# Patient Record
Sex: FEMALE | Race: WHITE | NOT HISPANIC OR LATINO | Employment: FULL TIME | URBAN - METROPOLITAN AREA
[De-identification: names, ages, dates, MRNs, and addresses within clinical notes are randomized per-mention and may not be internally consistent; named-entity substitution may affect disease eponyms.]

---

## 2019-07-24 ENCOUNTER — OFFICE VISIT CONVERTED (OUTPATIENT)
Dept: FAMILY MEDICINE CLINIC | Facility: CLINIC | Age: 34
End: 2019-07-24
Attending: NURSE PRACTITIONER

## 2019-08-02 ENCOUNTER — HOSPITAL ENCOUNTER (OUTPATIENT)
Dept: SLEEP MEDICINE | Facility: HOSPITAL | Age: 34
Discharge: HOME | End: 2019-08-02
Attending: INTERNAL MEDICINE

## 2019-08-20 ENCOUNTER — HOSPITAL ENCOUNTER (OUTPATIENT)
Dept: SLEEP MEDICINE | Facility: HOSPITAL | Age: 34
Discharge: HOME | End: 2019-08-20
Attending: INTERNAL MEDICINE

## 2019-09-13 ENCOUNTER — HOSPITAL ENCOUNTER (OUTPATIENT)
Dept: SLEEP MEDICINE | Facility: HOSPITAL | Age: 34
Discharge: HOME | End: 2019-09-13
Attending: INTERNAL MEDICINE

## 2019-11-12 ENCOUNTER — OFFICE VISIT CONVERTED (OUTPATIENT)
Dept: FAMILY MEDICINE CLINIC | Facility: CLINIC | Age: 34
End: 2019-11-12
Attending: NURSE PRACTITIONER

## 2020-01-20 ENCOUNTER — CONVERSION ENCOUNTER (OUTPATIENT)
Dept: FAMILY MEDICINE CLINIC | Facility: CLINIC | Age: 35
End: 2020-01-20

## 2020-02-04 ENCOUNTER — OFFICE VISIT CONVERTED (OUTPATIENT)
Dept: FAMILY MEDICINE CLINIC | Facility: CLINIC | Age: 35
End: 2020-02-04
Attending: FAMILY MEDICINE

## 2020-02-04 ENCOUNTER — HOSPITAL ENCOUNTER (OUTPATIENT)
Dept: FAMILY MEDICINE CLINIC | Facility: CLINIC | Age: 35
Discharge: HOME | End: 2020-02-04
Attending: FAMILY MEDICINE

## 2020-02-25 ENCOUNTER — CONVERSION ENCOUNTER (OUTPATIENT)
Dept: NEUROLOGY | Facility: CLINIC | Age: 35
End: 2020-02-25

## 2020-02-25 ENCOUNTER — OFFICE VISIT CONVERTED (OUTPATIENT)
Dept: NEUROLOGY | Facility: CLINIC | Age: 35
End: 2020-02-25
Attending: PSYCHIATRY & NEUROLOGY

## 2020-03-09 ENCOUNTER — HOSPITAL ENCOUNTER (OUTPATIENT)
Dept: FAMILY MEDICINE CLINIC | Facility: CLINIC | Age: 35
Discharge: HOME | End: 2020-03-09
Attending: FAMILY MEDICINE

## 2020-03-09 ENCOUNTER — OFFICE VISIT CONVERTED (OUTPATIENT)
Dept: FAMILY MEDICINE CLINIC | Facility: CLINIC | Age: 35
End: 2020-03-09
Attending: FAMILY MEDICINE

## 2020-03-11 LAB — BACTERIA SPEC AEROBE CULT: ABNORMAL

## 2020-09-24 ENCOUNTER — HOSPITAL ENCOUNTER (OUTPATIENT)
Dept: FAMILY MEDICINE CLINIC | Facility: CLINIC | Age: 35
Discharge: HOME | End: 2020-09-24
Attending: NURSE PRACTITIONER

## 2020-09-24 ENCOUNTER — OFFICE VISIT CONVERTED (OUTPATIENT)
Dept: FAMILY MEDICINE CLINIC | Facility: CLINIC | Age: 35
End: 2020-09-24
Attending: NURSE PRACTITIONER

## 2020-09-24 LAB
BASOPHILS # BLD AUTO: 0.04 10*3/UL (ref 0–0.2)
BASOPHILS NFR BLD AUTO: 0.5 % (ref 0–3)
CONV ABS IMM GRAN: 0.02 10*3/UL (ref 0–0.2)
CONV IMMATURE GRAN: 0.2 % (ref 0–1.8)
DEPRECATED RDW RBC AUTO: 45.8 FL (ref 36.4–46.3)
EOSINOPHIL # BLD AUTO: 0.08 10*3/UL (ref 0–0.7)
EOSINOPHIL # BLD AUTO: 1 % (ref 0–7)
ERYTHROCYTE [DISTWIDTH] IN BLOOD BY AUTOMATED COUNT: 12.8 % (ref 11.7–14.4)
HCT VFR BLD AUTO: 44.3 % (ref 37–47)
HGB BLD-MCNC: 13.7 G/DL (ref 12–16)
LYMPHOCYTES # BLD AUTO: 2.58 10*3/UL (ref 1–5)
LYMPHOCYTES NFR BLD AUTO: 31.6 % (ref 20–45)
MCH RBC QN AUTO: 29.9 PG (ref 27–31)
MCHC RBC AUTO-ENTMCNC: 30.9 G/DL (ref 33–37)
MCV RBC AUTO: 96.7 FL (ref 81–99)
MONOCYTES # BLD AUTO: 0.55 10*3/UL (ref 0.2–1.2)
MONOCYTES NFR BLD AUTO: 6.7 % (ref 3–10)
NEUTROPHILS # BLD AUTO: 4.89 10*3/UL (ref 2–8)
NEUTROPHILS NFR BLD AUTO: 60 % (ref 30–85)
NRBC CBCN: 0 % (ref 0–0.7)
PLATELET # BLD AUTO: 345 10*3/UL (ref 130–400)
PMV BLD AUTO: 11.1 FL (ref 9.4–12.3)
RBC # BLD AUTO: 4.58 10*6/UL (ref 4.2–5.4)
WBC # BLD AUTO: 8.16 10*3/UL (ref 4.8–10.8)

## 2020-09-25 LAB
ALBUMIN SERPL-MCNC: 4.3 G/DL (ref 3.5–5)
ALBUMIN/GLOB SERPL: 1.5 {RATIO} (ref 1.4–2.6)
ALP SERPL-CCNC: 71 U/L (ref 42–98)
ALT SERPL-CCNC: 10 U/L (ref 10–40)
ANION GAP SERPL CALC-SCNC: 17 MMOL/L (ref 8–19)
AST SERPL-CCNC: 18 U/L (ref 15–50)
BILIRUB SERPL-MCNC: 0.28 MG/DL (ref 0.2–1.3)
BUN SERPL-MCNC: 8 MG/DL (ref 5–25)
BUN/CREAT SERPL: 14 {RATIO} (ref 6–20)
CALCIUM SERPL-MCNC: 9.2 MG/DL (ref 8.7–10.4)
CHLORIDE SERPL-SCNC: 102 MMOL/L (ref 99–111)
CHOLEST SERPL-MCNC: 159 MG/DL (ref 107–200)
CHOLEST/HDLC SERPL: 3.1 {RATIO} (ref 3–6)
CONV CO2: 23 MMOL/L (ref 22–32)
CONV TOTAL PROTEIN: 7.2 G/DL (ref 6.3–8.2)
CREAT UR-MCNC: 0.57 MG/DL (ref 0.5–0.9)
GFR SERPLBLD BASED ON 1.73 SQ M-ARVRAT: >60 ML/MIN/{1.73_M2}
GLOBULIN UR ELPH-MCNC: 2.9 G/DL (ref 2–3.5)
GLUCOSE SERPL-MCNC: 71 MG/DL (ref 65–99)
HDLC SERPL-MCNC: 51 MG/DL (ref 40–60)
LDLC SERPL CALC-MCNC: 89 MG/DL (ref 70–100)
OSMOLALITY SERPL CALC.SUM OF ELEC: 283 MOSM/KG (ref 273–304)
POTASSIUM SERPL-SCNC: 4.3 MMOL/L (ref 3.5–5.3)
SODIUM SERPL-SCNC: 138 MMOL/L (ref 135–147)
T4 FREE SERPL-MCNC: 1.4 NG/DL (ref 0.9–1.8)
TRIGL SERPL-MCNC: 93 MG/DL (ref 40–150)
TSH SERPL-ACNC: 3.14 M[IU]/L (ref 0.27–4.2)
VLDLC SERPL-MCNC: 19 MG/DL (ref 5–37)

## 2020-10-22 ENCOUNTER — OFFICE VISIT CONVERTED (OUTPATIENT)
Dept: FAMILY MEDICINE CLINIC | Facility: CLINIC | Age: 35
End: 2020-10-22
Attending: NURSE PRACTITIONER

## 2020-11-18 ENCOUNTER — CONVERSION ENCOUNTER (OUTPATIENT)
Dept: PLASTIC SURGERY | Facility: CLINIC | Age: 35
End: 2020-11-18

## 2020-11-18 ENCOUNTER — OFFICE VISIT CONVERTED (OUTPATIENT)
Dept: PLASTIC SURGERY | Facility: CLINIC | Age: 35
End: 2020-11-18
Attending: NURSE PRACTITIONER

## 2020-12-04 ENCOUNTER — HOSPITAL ENCOUNTER (OUTPATIENT)
Dept: MAMMOGRAPHY | Facility: HOSPITAL | Age: 35
Discharge: HOME | End: 2020-12-04
Attending: NURSE PRACTITIONER

## 2020-12-22 ENCOUNTER — OFFICE VISIT CONVERTED (OUTPATIENT)
Dept: FAMILY MEDICINE CLINIC | Facility: CLINIC | Age: 35
End: 2020-12-22
Attending: FAMILY MEDICINE

## 2020-12-22 ENCOUNTER — HOSPITAL ENCOUNTER (OUTPATIENT)
Dept: FAMILY MEDICINE CLINIC | Facility: CLINIC | Age: 35
Discharge: HOME | End: 2020-12-22
Attending: FAMILY MEDICINE

## 2020-12-23 LAB — SARS-COV-2 RNA SPEC QL NAA+PROBE: NOT DETECTED

## 2020-12-24 LAB — BACTERIA SPEC AEROBE CULT: NORMAL

## 2021-04-28 ENCOUNTER — OFFICE VISIT CONVERTED (OUTPATIENT)
Dept: FAMILY MEDICINE CLINIC | Facility: CLINIC | Age: 36
End: 2021-04-28
Attending: NURSE PRACTITIONER

## 2021-04-28 ENCOUNTER — HOSPITAL ENCOUNTER (OUTPATIENT)
Dept: FAMILY MEDICINE CLINIC | Facility: CLINIC | Age: 36
Discharge: HOME | End: 2021-04-28
Attending: NURSE PRACTITIONER

## 2021-04-30 LAB
ALBUMIN SERPL-MCNC: 4.1 G/DL (ref 3.5–5)
ALBUMIN/GLOB SERPL: 1.5 {RATIO} (ref 1.4–2.6)
ALP SERPL-CCNC: 54 U/L (ref 42–98)
ALT SERPL-CCNC: 11 U/L (ref 10–40)
ANION GAP SERPL CALC-SCNC: 13 MMOL/L (ref 8–19)
AST SERPL-CCNC: 16 U/L (ref 15–50)
BASOPHILS # BLD AUTO: 0.03 10*3/UL (ref 0–0.2)
BASOPHILS NFR BLD AUTO: 0.3 % (ref 0–3)
BILIRUB SERPL-MCNC: 0.26 MG/DL (ref 0.2–1.3)
BUN SERPL-MCNC: 11 MG/DL (ref 5–25)
BUN/CREAT SERPL: 18 {RATIO} (ref 6–20)
CALCIUM SERPL-MCNC: 9.1 MG/DL (ref 8.7–10.4)
CHLORIDE SERPL-SCNC: 104 MMOL/L (ref 99–111)
CHOLEST SERPL-MCNC: 149 MG/DL (ref 107–200)
CHOLEST/HDLC SERPL: 2.8 {RATIO} (ref 3–6)
CONV ABS IMM GRAN: 0.04 10*3/UL (ref 0–0.2)
CONV CO2: 24 MMOL/L (ref 22–32)
CONV IMMATURE GRAN: 0.4 % (ref 0–1.8)
CONV TOTAL PROTEIN: 6.8 G/DL (ref 6.3–8.2)
CREAT UR-MCNC: 0.6 MG/DL (ref 0.5–0.9)
DEPRECATED RDW RBC AUTO: 42.8 FL (ref 36.4–46.3)
EOSINOPHIL # BLD AUTO: 0.11 10*3/UL (ref 0–0.7)
EOSINOPHIL # BLD AUTO: 1.1 % (ref 0–7)
ERYTHROCYTE [DISTWIDTH] IN BLOOD BY AUTOMATED COUNT: 12.6 % (ref 11.7–14.4)
GFR SERPLBLD BASED ON 1.73 SQ M-ARVRAT: >60 ML/MIN/{1.73_M2}
GLOBULIN UR ELPH-MCNC: 2.7 G/DL (ref 2–3.5)
GLUCOSE SERPL-MCNC: 79 MG/DL (ref 65–99)
HCT VFR BLD AUTO: 39.6 % (ref 37–47)
HDLC SERPL-MCNC: 53 MG/DL (ref 40–60)
HGB BLD-MCNC: 12.6 G/DL (ref 12–16)
LDLC SERPL CALC-MCNC: 68 MG/DL (ref 70–100)
LYMPHOCYTES # BLD AUTO: 2.88 10*3/UL (ref 1–5)
LYMPHOCYTES NFR BLD AUTO: 28.5 % (ref 20–45)
MCH RBC QN AUTO: 29.7 PG (ref 27–31)
MCHC RBC AUTO-ENTMCNC: 31.8 G/DL (ref 33–37)
MCV RBC AUTO: 93.4 FL (ref 81–99)
MONOCYTES # BLD AUTO: 0.64 10*3/UL (ref 0.2–1.2)
MONOCYTES NFR BLD AUTO: 6.3 % (ref 3–10)
NEUTROPHILS # BLD AUTO: 6.4 10*3/UL (ref 2–8)
NEUTROPHILS NFR BLD AUTO: 63.4 % (ref 30–85)
NRBC CBCN: 0 % (ref 0–0.7)
OSMOLALITY SERPL CALC.SUM OF ELEC: 282 MOSM/KG (ref 273–304)
PLATELET # BLD AUTO: 325 10*3/UL (ref 130–400)
PMV BLD AUTO: 10.3 FL (ref 9.4–12.3)
POTASSIUM SERPL-SCNC: 4.3 MMOL/L (ref 3.5–5.3)
RBC # BLD AUTO: 4.24 10*6/UL (ref 4.2–5.4)
SODIUM SERPL-SCNC: 137 MMOL/L (ref 135–147)
TRIGL SERPL-MCNC: 142 MG/DL (ref 40–150)
TSH SERPL-ACNC: 5.33 M[IU]/L (ref 0.27–4.2)
VLDLC SERPL-MCNC: 28 MG/DL (ref 5–37)
WBC # BLD AUTO: 10.1 10*3/UL (ref 4.8–10.8)

## 2021-05-03 LAB
CONV ANTI MICROSOMAL AB: 213 IU/ML (ref 0–34)
THYROGLOBULIN ANTIBODY: 37.2 IU/ML (ref 0–0.9)

## 2021-05-09 VITALS
WEIGHT: 234.56 LBS | HEART RATE: 90 BPM | TEMPERATURE: 96.9 F | HEIGHT: 69 IN | DIASTOLIC BLOOD PRESSURE: 80 MMHG | SYSTOLIC BLOOD PRESSURE: 122 MMHG | OXYGEN SATURATION: 98 % | BODY MASS INDEX: 34.74 KG/M2

## 2021-05-09 VITALS
HEART RATE: 58 BPM | WEIGHT: 232.25 LBS | HEIGHT: 69 IN | DIASTOLIC BLOOD PRESSURE: 64 MMHG | BODY MASS INDEX: 34.4 KG/M2 | TEMPERATURE: 98.7 F | SYSTOLIC BLOOD PRESSURE: 124 MMHG | OXYGEN SATURATION: 99 %

## 2021-05-09 VITALS
WEIGHT: 230 LBS | HEART RATE: 87 BPM | TEMPERATURE: 97.5 F | OXYGEN SATURATION: 99 % | BODY MASS INDEX: 34.07 KG/M2 | DIASTOLIC BLOOD PRESSURE: 84 MMHG | HEIGHT: 69 IN | SYSTOLIC BLOOD PRESSURE: 128 MMHG

## 2021-05-09 VITALS
BODY MASS INDEX: 34.23 KG/M2 | OXYGEN SATURATION: 98 % | HEIGHT: 69 IN | HEART RATE: 74 BPM | SYSTOLIC BLOOD PRESSURE: 118 MMHG | TEMPERATURE: 97.9 F | WEIGHT: 231.12 LBS | DIASTOLIC BLOOD PRESSURE: 60 MMHG

## 2021-05-09 VITALS
DIASTOLIC BLOOD PRESSURE: 62 MMHG | HEART RATE: 93 BPM | BODY MASS INDEX: 34.26 KG/M2 | HEIGHT: 69 IN | OXYGEN SATURATION: 98 % | SYSTOLIC BLOOD PRESSURE: 128 MMHG | TEMPERATURE: 98.1 F | WEIGHT: 231.31 LBS

## 2021-05-09 VITALS
BODY MASS INDEX: 33.79 KG/M2 | DIASTOLIC BLOOD PRESSURE: 78 MMHG | WEIGHT: 228.12 LBS | SYSTOLIC BLOOD PRESSURE: 120 MMHG | OXYGEN SATURATION: 99 % | TEMPERATURE: 98.6 F | HEIGHT: 69 IN | HEART RATE: 92 BPM

## 2021-05-09 VITALS
OXYGEN SATURATION: 97 % | WEIGHT: 227 LBS | SYSTOLIC BLOOD PRESSURE: 122 MMHG | HEIGHT: 69 IN | BODY MASS INDEX: 33.62 KG/M2 | DIASTOLIC BLOOD PRESSURE: 74 MMHG | TEMPERATURE: 98 F | HEART RATE: 118 BPM

## 2021-05-09 VITALS
WEIGHT: 231 LBS | HEART RATE: 120 BPM | HEIGHT: 69 IN | BODY MASS INDEX: 34.21 KG/M2 | TEMPERATURE: 98 F | DIASTOLIC BLOOD PRESSURE: 90 MMHG | OXYGEN SATURATION: 97 % | SYSTOLIC BLOOD PRESSURE: 140 MMHG

## 2021-05-09 VITALS — TEMPERATURE: 97.8 F | HEART RATE: 76 BPM | OXYGEN SATURATION: 92 %

## 2021-05-10 NOTE — H&P
"   History and Physical      Patient Name: Annie Oshea   Patient ID: [id number]   Sex: Female   YOB: 1985    Primary Care Provider: Yuliana UMAÑA   Referring Provider: Yuliana UMAÑA    Visit Date: November 18, 2020    Provider: JOSE A Govea   Location: [facility]   Location Address: [address] [place] [address]  [id number]   Location Phone: [phone]          Chief Complaint  · \"My breasts are too big\"  · \"I'm having shoulder and back pain\"  · \"My bra straps are cutting into my shoulders\"  · \"I have rashes under my breasts\"  · \"I can't exercise because of my breasts\"      History Of Present Illness  Annie Oshea is a 35 year old /White female who presents to the office today as a consult from Yuliana UMAÑA.   Annie Oshea is a 35 year old /White female who presents to the office today as a consult from Yuliana UMAÑA and would like to discuss breast reduction. Current bra size 40F , would like to be a full D. No personal history and/or no positive family (who) have a history of breast cancer. Neck, shoulders, and upper back pain present for 15 years. Conservative treatments of ibuprofen, massage, and chiropractic referral , with little or temporary relief. Experiences rashes, treats with keeping dry, deodorant. Trouble sleeping, cannot get comfortable. Cannot exercise , cannot do ACTIVITIES that she would like to do, generally has to wear many sports bras and has to special order bras . Recommendations for today of breast reduction. Recommendations for weight loss in order to lower the Schur requirement.      Past Medical History includes: has a chiropractor referral for back, shoulder and neck pain and will be starting this soon. Currently on weight watchers right now would like to lose about 35 more pounds. Goal weight is 190lbs. Has history of depression and anxiety but is controlled with " "medications and therapist.  History of hypertension with preeclampsia, no bp issues now. Is a non smoker. Is a  with infants right now and planning to have a different job prior to surgery but does have lift a lot. Is a single mom and kids go to fathers in the summer. Would like to have surgery in summer when the kids are gone. Would like to have surgery around the end of June.      Recommend getting closer to goal weight to lower the schnur scale. We will also need updated mammogram. We also need more conservative treatment. Will see her back in April to see if closer to goal weight.       Past Medical History  Anxiety; Chiari malformation; Depression; High blood pressure; Migraine headache; Psychiatric diagnosis; Shortness of Breath; Sinus trouble; Thyroid Disease         Past Surgical History  Ureteroscopy with laser lithotripsy         Medication List  Mirena 20 mcg/24 hours (5 yrs) 52 mg intrauterine intrauterine device; Synthroid 50 mcg oral tablet         Allergy List  NO KNOWN DRUG ALLERGIES         Family Medical History  Heart Disease; Diabetes; Arthrtis         Social History  Alcohol (Light); Tobacco (Never)         Review of Systems  · Cardiovascular  o Denies  o : chest pain, lower extremity edema, Heart Attack, Abnormal EKG  · Respiratory  o Denies  o : shortness of breath, wheezing, cough  · Neurologic  o Denies  o : muscular weakness, incoordination, tingling or numbness, headaches  · Psychiatric  o Denies  o : anxiety, depression, difficulty sleeping      Vitals  Date Time BP Position Site L\R Cuff Size HR RR TEMP (F) WT  HT  BMI kg/m2 BSA m2 O2 Sat FR L/min FiO2        11/18/2020 10:18 /84 Sitting    78 - R  98.2 230lbs 2oz 5'  9\" 33.98 2.25 96 %  21%          Physical Examination  · Constitutional  o Appearance  o : well developed, well-nourished, Alert and oriented X3  · Eyes  o Sclerae  o : sclerae white  · Respiratory  o Respiratory Effort  o : breathing unlabored "   · Cardiovascular  o Heart  o : regular rate  · Breasts  o FEMALE BREAST EXAM  o :   § Masses  § : no masses  § Nipple Discharge  § : no nipple discharge  § Axillary Lymphadenopathy  § : no axillary lymphadenopathy  § SN-N (Right Breast)  § : 30  § SN-N (Left Breast)  § : 31  § SN-IMF (Right Breast)  § : 21  § SN-IMF (Left Breast)  § : 21  § N-IMF stretch (Right Breast)  § : 10  § N-IMF stretch (Left Breast)  § : 10  · Lymphatic  o Axilla  o : No lymphadenopathy present  · Skin and Subcutaneous Tissue  o General Inspection  o : no lesions present, no areas of discoloration, skin turgor normal, texture normal  · Psychiatric  o Judgement and Insight  o : judgment and insight intact  o Mood and Affect  o : mood normal, affect appropriate  · Schnur Scale  o Schnur Scale Score  o : 978  · BSA  o BSA  o : 2.25     Large breast with moisture underneath. Left breast is larger than right. Mild bilateral shoulder grooving.                 Assessment  · Macromastia       Hypertrophy of breast     611.1/N62      Plan  · Orders  o Plastics reconstructive visit ([facility]) - - 11/18/2020  o Screening Mammogram 2D Bilateral (, 85455) - 611.1/N62 - 11/18/2020   baseline mammogram prior to breast reduction surgery in June.  · Medications  o Medications have been Reconciled  o Transition of Care or Provider Policy  · Instructions  o Greater than 45 min of time was spent directly with the pt in counseling & coordination of care.  o We discussed the procedure for bilateral breast reduction under general anesthesia as well as the postoperative recovery time and the need for limitation of activities. The risks of surgery were discussed including bleeding, infection, scarring (for which diagrams were drawn), pain, poor healing, loss of skin and or nipple, change in nipple sensation, inability to breast feed, asymmetry, no guarantee of postoperative cup size.  o Discussed options. Patient would like to have surgery in June and get closer  to goal weight. We will order a mammogram and have her return in April to see Dr. Lee to schedule surgery.            Electronically Signed by: JOSE A Govea -Author on November 18, 2020 10:59:27 AM

## 2021-05-14 VITALS
SYSTOLIC BLOOD PRESSURE: 126 MMHG | BODY MASS INDEX: 34.08 KG/M2 | HEART RATE: 78 BPM | OXYGEN SATURATION: 96 % | HEIGHT: 69 IN | TEMPERATURE: 98.2 F | WEIGHT: 230.12 LBS | DIASTOLIC BLOOD PRESSURE: 84 MMHG

## 2021-05-15 VITALS
HEIGHT: 69 IN | HEART RATE: 82 BPM | WEIGHT: 232 LBS | DIASTOLIC BLOOD PRESSURE: 75 MMHG | BODY MASS INDEX: 34.36 KG/M2 | SYSTOLIC BLOOD PRESSURE: 124 MMHG

## 2021-07-27 ENCOUNTER — TELEPHONE (OUTPATIENT)
Dept: FAMILY MEDICINE CLINIC | Facility: CLINIC | Age: 36
End: 2021-07-27

## 2021-07-27 NOTE — TELEPHONE ENCOUNTER
Caller: Annie Oshea    Relationship: Self    Best call back number:     What medication are you requesting: A MEDICATION TO HELP WITH SYMPTOMS    What are your current symptoms: BURNING URINATION, FREQUENT URINATION, URGENT URINATION, URINATION ODOR    How long have you been experiencing symptoms: SINCE 07/24/21    Have you had these symptoms before:    [x] Yes  [] No    Have you been treated for these symptoms before:   [] Yes  [] No    If a prescription is needed, what is your preferred pharmacy and phone number:      [address] [phone] [facility] [phone]

## 2021-08-10 PROBLEM — F41.9 ANXIETY: Status: ACTIVE | Noted: 2021-08-10

## 2021-08-10 PROBLEM — E03.9 HYPOTHYROID: Status: ACTIVE | Noted: 2021-08-10

## 2021-08-10 PROBLEM — F99 PSYCHIATRIC DIAGNOSIS: Status: ACTIVE | Noted: 2021-08-10

## 2021-08-10 PROBLEM — G93.5 CHIARI I MALFORMATION (HCC): Status: ACTIVE | Noted: 2021-08-10

## 2021-08-10 PROBLEM — I10 HIGH BLOOD PRESSURE: Status: ACTIVE | Noted: 2021-08-10

## 2021-08-10 PROBLEM — O24.419 GESTATIONAL DIABETES: Status: ACTIVE | Noted: 2021-08-10

## 2021-08-10 PROBLEM — E06.3 HASHIMOTO'S DISEASE: Status: ACTIVE | Noted: 2021-08-10

## 2021-08-10 PROBLEM — O14.90 PRE-ECLAMPSIA: Status: ACTIVE | Noted: 2021-08-10

## 2021-08-10 PROBLEM — F32.A DEPRESSION: Status: ACTIVE | Noted: 2021-08-10

## 2021-08-10 RX ORDER — LEVOTHYROXINE SODIUM 0.05 MG/1
TABLET ORAL
COMMUNITY
End: 2021-08-11 | Stop reason: SDUPTHER

## 2021-08-10 RX ORDER — MULTIPLE VITAMINS W/ MINERALS TAB 9MG-400MCG
TAB ORAL
COMMUNITY
End: 2022-08-23

## 2021-08-11 ENCOUNTER — OFFICE VISIT (OUTPATIENT)
Dept: FAMILY MEDICINE CLINIC | Facility: CLINIC | Age: 36
End: 2021-08-11

## 2021-08-11 VITALS
OXYGEN SATURATION: 98 % | BODY MASS INDEX: 34.51 KG/M2 | HEIGHT: 69 IN | HEART RATE: 81 BPM | SYSTOLIC BLOOD PRESSURE: 132 MMHG | DIASTOLIC BLOOD PRESSURE: 84 MMHG | TEMPERATURE: 96.7 F | WEIGHT: 233 LBS

## 2021-08-11 DIAGNOSIS — R31.9 HEMATURIA, UNSPECIFIED TYPE: ICD-10-CM

## 2021-08-11 DIAGNOSIS — F41.9 ANXIETY: ICD-10-CM

## 2021-08-11 DIAGNOSIS — E06.3 HYPOTHYROIDISM DUE TO HASHIMOTO'S THYROIDITIS: ICD-10-CM

## 2021-08-11 DIAGNOSIS — N39.0 URINARY TRACT INFECTION WITH HEMATURIA, SITE UNSPECIFIED: Primary | ICD-10-CM

## 2021-08-11 DIAGNOSIS — E03.8 HYPOTHYROIDISM DUE TO HASHIMOTO'S THYROIDITIS: ICD-10-CM

## 2021-08-11 DIAGNOSIS — F33.41 RECURRENT MAJOR DEPRESSIVE DISORDER, IN PARTIAL REMISSION (HCC): ICD-10-CM

## 2021-08-11 DIAGNOSIS — R31.9 URINARY TRACT INFECTION WITH HEMATURIA, SITE UNSPECIFIED: Primary | ICD-10-CM

## 2021-08-11 DIAGNOSIS — Z09 FOLLOW UP: ICD-10-CM

## 2021-08-11 LAB
BILIRUB BLD-MCNC: NEGATIVE MG/DL
CLARITY, POC: ABNORMAL
COLOR UR: YELLOW
GLUCOSE UR STRIP-MCNC: NEGATIVE MG/DL
KETONES UR QL: NEGATIVE
LEUKOCYTE EST, POC: NEGATIVE
NITRITE UR-MCNC: NEGATIVE MG/ML
PH UR: 7 [PH] (ref 5–8)
PROT UR STRIP-MCNC: NEGATIVE MG/DL
RBC # UR STRIP: ABNORMAL /UL
SP GR UR: 1.01 (ref 1–1.03)
UROBILINOGEN UR QL: NORMAL

## 2021-08-11 PROCEDURE — 87086 URINE CULTURE/COLONY COUNT: CPT | Performed by: NURSE PRACTITIONER

## 2021-08-11 PROCEDURE — 81002 URINALYSIS NONAUTO W/O SCOPE: CPT | Performed by: NURSE PRACTITIONER

## 2021-08-11 PROCEDURE — 99214 OFFICE O/P EST MOD 30 MIN: CPT | Performed by: NURSE PRACTITIONER

## 2021-08-11 RX ORDER — LEVOTHYROXINE SODIUM 0.05 MG/1
50 TABLET ORAL
Qty: 30 TABLET | Refills: 5 | Status: SHIPPED | OUTPATIENT
Start: 2021-08-11 | End: 2022-04-06 | Stop reason: SDUPTHER

## 2021-08-11 RX ORDER — CEFDINIR 300 MG/1
300 CAPSULE ORAL 2 TIMES DAILY
COMMUNITY
End: 2021-08-27

## 2021-08-11 NOTE — PROGRESS NOTES
"Chief Complaint  Thyroid Problem (f/u on thyroid and a uti)    Subjective            Annie Oshea presents to [facility]  Thyroid F/U today at last labs in April the TSH was a little elevated--and pt reports taking the meds regularly --and doing well --admits not been taking it well at first--and then now for approx 1 week now taking the thyroid meds daily--then prior to that was approx 2-3 days weekly--but now has her alarm on her phone and taking the med daily now     _______________________________________________    Pt also was seen at the Acute Care last Thursday and was (+) for a UTI and pt reports first was placed on bactrim DS and then th pt was changed to Omnicef--and she is calling now for the Cx results--and no Cx was done--per the Acute Care nurse--and pt was told was a \"bad UTI\"--also was on azo OTC as well that day --pt was first seen per telehealth good Rx doctor and was taking the bactrim through them and then admits did not take the meds correctly --then the s/s got so bad-and then reports was then seen at the clinic (acute care)     _______________________________________________    Depression with anxiety: was better on the zoloft and then was like a hit and miss and was doing good and then ran out and then new job and then a break up with boyfriend and things are doing better no SI/HI        PHQ-2 Total Score: 1  PHQ-9 Total Score: 1    Past Medical History:   Diagnosis Date   • Anxiety    • Chiari I malformation (CMS/HCC)    • Depression    • Gestational diabetes    • Hashimoto's disease    • Hypothyroid    • Kidney stones    • Pre-eclampsia        No Known Allergies     Past Surgical History:   Procedure Laterality Date   • MOLE REMOVAL     • WISDOM TOOTH EXTRACTION          Social History     Tobacco Use   • Smoking status: Never Smoker   • Smokeless tobacco: Never Used   Vaping Use   • Vaping Use: Never used   Substance Use Topics   • Alcohol use: Not " Currently     Comment: Every day 7 or less drinks per week   • Drug use: Never       Family History   Problem Relation Age of Onset   • Depression Mother    • Hypertension Mother    • Arthritis Mother    • Hypertension Father    • Arthritis Sister    • Asthma Brother    • Depression Maternal Grandmother    • Diabetes type II Maternal Grandmother    • Hypertension Maternal Grandmother    • Arthritis Maternal Grandmother    • Diabetes type II Maternal Grandfather    • Hypertension Paternal Grandmother    • Alcohol abuse Paternal Grandfather    • Heart disease Other         Health Maintenance Due   Topic Date Due   • ANNUAL PHYSICAL  Never done   • TDAP/TD VACCINES (1 - Tdap) Never done   • HEPATITIS C SCREENING  Never done   • PAP SMEAR  Never done        Current Outpatient Medications on File Prior to Visit   Medication Sig   • cefdinir (OMNICEF) 300 MG capsule Take 300 mg by mouth 2 (Two) Times a Day.   • levonorgestrel (Mirena, 52 MG,) 20 MCG/24HR IUD    • multivitamin with minerals (WOMENS ONE DAILY PO)    • [DISCONTINUED] levothyroxine (Synthroid) 50 MCG tablet Synthroid 50 mcg oral tablet take 1 tablet (50 mcg) by oral route once daily   Active   • [DISCONTINUED] sertraline (ZOLOFT) 50 MG tablet Take 50 mg by mouth Daily.     No current facility-administered medications on file prior to visit.       Immunization History   Administered Date(s) Administered   • Influenza, Unspecified 11/07/2019, 09/24/2020       [facility]  Review of Systems   Constitutional: Negative.    HENT: Negative.    Eyes: Negative.    Respiratory: Negative.    Cardiovascular: Negative.    Gastrointestinal: Negative.    Endocrine: Negative.    Genitourinary: Negative.  Negative for dysuria, frequency and urgency.        A little sensation of the urethra--and pt reports like an urge to go to the bathroom    Musculoskeletal: Negative.    Skin: Negative.    Allergic/Immunologic: Negative.    Neurological: Negative.    Hematological: Negative.  "   Psychiatric/Behavioral: Positive for depressed mood. Negative for agitation, behavioral problems, decreased concentration, dysphoric mood, hallucinations, self-injury, sleep disturbance, suicidal ideas, negative for hyperactivity and stress. The patient is nervous/anxious.         Objective     /84 (BP Location: Right arm, Patient Position: Sitting, Cuff Size: Adult)   Pulse 81   Temp 96.7 °F (35.9 °C) (Temporal)   Ht 175.3 cm (69\")   Wt 106 kg (233 lb)   SpO2 98%   BMI 34.41 kg/m²       Physical Exam  Vitals and nursing note reviewed.   Constitutional:       Appearance: Normal appearance.   HENT:      Head: Normocephalic.      Right Ear: Tympanic membrane normal.      Left Ear: Tympanic membrane normal.      Nose: Nose normal.      Mouth/Throat:      Mouth: Mucous membranes are moist.   Eyes:      Pupils: Pupils are equal, round, and reactive to light.   Cardiovascular:      Rate and Rhythm: Normal rate and regular rhythm.      Heart sounds: Normal heart sounds.   Pulmonary:      Effort: Pulmonary effort is normal.      Breath sounds: Normal breath sounds.   Abdominal:      General: Bowel sounds are normal.      Palpations: Abdomen is soft.      Tenderness: There is no abdominal tenderness. There is no right CVA tenderness or left CVA tenderness.   Musculoskeletal:         General: Normal range of motion.      Cervical back: Normal range of motion and neck supple.   Skin:     General: Skin is warm and dry.   Neurological:      Mental Status: She is alert and oriented to person, place, and time.   Psychiatric:         Mood and Affect: Mood normal.         Behavior: Behavior normal.         Thought Content: Thought content normal.         Judgment: Judgment normal.         Result Review :     The following data was reviewed by: JOSE A Phillips on 08/11/2021:        POCT urinalysis dipstick, manual (08/11/2021 09:29)  Physical Test Panel (04/30/2021 15:00)  Thyroid Antibodies (04/30/2021 " 15:00)             Assessment and Plan      Diagnoses and all orders for this visit:    1. Urinary tract infection with hematuria, site unspecified (Primary)  -     POCT urinalysis dipstick, manual  -     Urine Culture - Urine, Urine, Clean Catch    2. Follow up  -     Urine Culture - Urine, Urine, Clean Catch  -     TSH+Free T4; Future    3. Hematuria, unspecified type  -     Urine Culture - Urine, Urine, Clean Catch    4. Hypothyroidism due to Hashimoto's thyroiditis  -     TSH+Free T4; Future  -     levothyroxine (Synthroid) 50 MCG tablet; Take 1 tablet by mouth Every Morning.  Dispense: 30 tablet; Refill: 5    5. Anxiety    6. Recurrent major depressive disorder, in partial remission (CMS/HCC)  -     sertraline (ZOLOFT) 50 MG tablet; Take 1 tablet by mouth Daily.  Dispense: 30 tablet; Refill: 5            Follow Up     Return in about 6 months (around 2/11/2022), or if symptoms worsen or fail to improve.

## 2021-08-12 LAB — BACTERIA SPEC AEROBE CULT: NORMAL

## 2021-08-13 NOTE — PROGRESS NOTES
Please mail letter to patient stating    Annie, the urine culture shows mixed altaf and nothing predominate or any dominant bacteria and if you still have any issues or persistent symptoms it does suggest you follow back up in the office for another recollection

## 2021-08-27 PROCEDURE — U0003 INFECTIOUS AGENT DETECTION BY NUCLEIC ACID (DNA OR RNA); SEVERE ACUTE RESPIRATORY SYNDROME CORONAVIRUS 2 (SARS-COV-2) (CORONAVIRUS DISEASE [COVID-19]), AMPLIFIED PROBE TECHNIQUE, MAKING USE OF HIGH THROUGHPUT TECHNOLOGIES AS DESCRIBED BY CMS-2020-01-R: HCPCS | Performed by: FAMILY MEDICINE

## 2021-08-31 PROCEDURE — U0004 COV-19 TEST NON-CDC HGH THRU: HCPCS | Performed by: EMERGENCY MEDICINE

## 2022-01-04 ENCOUNTER — TELEMEDICINE (OUTPATIENT)
Dept: FAMILY MEDICINE CLINIC | Facility: TELEHEALTH | Age: 37
End: 2022-01-04

## 2022-01-04 DIAGNOSIS — R39.89 SUSPECTED UTI: Primary | ICD-10-CM

## 2022-01-04 PROCEDURE — 99213 OFFICE O/P EST LOW 20 MIN: CPT | Performed by: NURSE PRACTITIONER

## 2022-01-04 RX ORDER — FLUCONAZOLE 150 MG/1
150 TABLET ORAL ONCE
Qty: 1 TABLET | Refills: 0 | Status: SHIPPED | OUTPATIENT
Start: 2022-01-04 | End: 2022-01-04

## 2022-01-04 RX ORDER — PHENAZOPYRIDINE HYDROCHLORIDE 200 MG/1
200 TABLET, FILM COATED ORAL 3 TIMES DAILY PRN
Qty: 6 TABLET | Refills: 0 | Status: SHIPPED | OUTPATIENT
Start: 2022-01-04 | End: 2022-01-06

## 2022-01-04 RX ORDER — NITROFURANTOIN 25; 75 MG/1; MG/1
100 CAPSULE ORAL 2 TIMES DAILY
Qty: 14 CAPSULE | Refills: 0 | Status: SHIPPED | OUTPATIENT
Start: 2022-01-04 | End: 2022-01-11

## 2022-01-04 RX ORDER — ETONOGESTREL AND ETHINYL ESTRADIOL 11.7; 2.7 MG/1; MG/1
INSERT, EXTENDED RELEASE VAGINAL
COMMUNITY

## 2022-01-04 NOTE — PROGRESS NOTES
DAVIN Oshea is a 36 y.o. female  presents with complaint of burning with urination for 3 days. Thought she was flushing everything out due to increased fluids but burning worsened today. Also reports frequency, small amounts of urine, and small amounts of blood. Took Azo today which helped a little. Has had kidney stones in the past but not having any severe pain today; was pregnant during that episode.     Review of Systems   Constitutional: Negative for chills, diaphoresis and fever.   Respiratory: Negative for cough and shortness of breath.    Cardiovascular: Negative for chest pain.   Gastrointestinal: Negative for diarrhea, nausea and vomiting. Abdominal pain: discomfort.   Genitourinary: Positive for dysuria, frequency, hematuria and urgency. Negative for flank pain and vaginal discharge.       Past Medical History:   Diagnosis Date   • Anxiety    • Chiari I malformation (CMS/HCC)    • Depression    • Gestational diabetes    • Hashimoto's disease    • Hypothyroid    • Kidney stones    • Pre-eclampsia        Family History   Problem Relation Age of Onset   • Depression Mother    • Hypertension Mother    • Arthritis Mother    • Hypertension Father    • Arthritis Sister    • Asthma Brother    • Depression Maternal Grandmother    • Diabetes type II Maternal Grandmother    • Hypertension Maternal Grandmother    • Arthritis Maternal Grandmother    • Diabetes type II Maternal Grandfather    • Hypertension Paternal Grandmother    • Alcohol abuse Paternal Grandfather    • Heart disease Other        Social History     Socioeconomic History   • Marital status: Other   Tobacco Use   • Smoking status: Never Smoker   • Smokeless tobacco: Never Used   Vaping Use   • Vaping Use: Never used   Substance and Sexual Activity   • Alcohol use: Not Currently     Comment: Every day 7 or less drinks per week   • Drug use: Never   • Sexual activity: Defer         There were no vitals taken for this visit.    PHYSICAL  EXAM  Physical Exam   Constitutional: She appears well-developed and well-nourished.   HENT:   Head: Normocephalic.   Nose: Nose normal.   Neck: Neck normal appearance.  Pulmonary/Chest: Effort normal.   Neurological: She is alert.   Psychiatric: She has a normal mood and affect. Her speech is normal.       Diagnoses and all orders for this visit:    1. Suspected UTI (Primary)  -     nitrofurantoin, macrocrystal-monohydrate, (Macrobid) 100 MG capsule; Take 1 capsule by mouth 2 (Two) Times a Day for 7 days.  Dispense: 14 capsule; Refill: 0  -     phenazopyridine (Pyridium) 200 MG tablet; Take 1 tablet by mouth 3 (Three) Times a Day As Needed for Bladder Spasms for up to 2 days.  Dispense: 6 tablet; Refill: 0  -     fluconazole (Diflucan) 150 MG tablet; Take 1 tablet by mouth 1 (One) Time for 1 dose.  Dispense: 1 tablet; Refill: 0          FOLLOW-UP  As discussed during visit with [facility], if symptoms worsen or fail to improve, follow-up with PCP/Urgent Care/Emergency Department.    Patient verbalizes understanding of medications, instructions for treatment and follow-up.    JOSE A Aragon  01/04/2022  09:07 EST    This visit was performed via Telehealth.  This patient has been instructed to follow-up with their primary care provider if their symptoms worsen or the treatment provided does not resolve their illness.    Annie Oshea verbally consented to a telehealth visit. Annie Oshea was seen via telehealth using real-time video conferencing technology by JOSE A Aragon. Annie Oshea was located at [place], [place], and JOSE A Aragon was located in [place], [place].

## 2022-01-04 NOTE — PATIENT INSTRUCTIONS
Urinary Tract Infection, Adult    A urinary tract infection (UTI) is an infection of any part of the urinary tract. The urinary tract includes the kidneys, ureters, bladder, and urethra. These organs make, store, and get rid of urine in the body.  Your health care provider may use other names to describe the infection. An upper UTI affects the ureters and kidneys (pyelonephritis). A lower UTI affects the bladder (cystitis) and urethra (urethritis).  What are the causes?  Most urinary tract infections are caused by bacteria in your genital area, around the entrance to your urinary tract (urethra). These bacteria grow and cause inflammation of your urinary tract.  What increases the risk?  You are more likely to develop this condition if:  · You have a urinary catheter that stays in place (indwelling).  · You are not able to control when you urinate or have a bowel movement (you have incontinence).  · You are female and you:  ? Use a spermicide or diaphragm for birth control.  ? Have low estrogen levels.  ? Are pregnant.  · You have certain genes that increase your risk (genetics).  · You are sexually active.  · You take antibiotic medicines.  · You have a condition that causes your flow of urine to slow down, such as:  ? An enlarged prostate, if you are male.  ? Blockage in your urethra (stricture).  ? A kidney stone.  ? A nerve condition that affects your bladder control (neurogenic bladder).  ? Not getting enough to drink, or not urinating often.  · You have certain medical conditions, such as:  ? Diabetes.  ? A weak disease-fighting system (immunesystem).  ? Sickle cell disease.  ? Gout.  ? Spinal cord injury.  What are the signs or symptoms?  Symptoms of this condition include:  · Needing to urinate right away (urgently).  · Frequent urination or passing small amounts of urine frequently.  · Pain or burning with urination.  · Blood in the urine.  · Urine that smells bad or unusual.  · Trouble urinating.  · Cloudy  urine.  · Vaginal discharge, if you are female.  · Pain in the abdomen or the lower back.  You may also have:  · Vomiting or a decreased appetite.  · Confusion.  · Irritability or tiredness.  · A fever.  · Diarrhea.  The first symptom in older adults may be confusion. In some cases, they may not have any symptoms until the infection has worsened.  How is this diagnosed?  This condition is diagnosed based on your medical history and a physical exam. You may also have other tests, including:  · Urine tests.  · Blood tests.  · Tests for sexually transmitted infections (STIs).  If you have had more than one UTI, a cystoscopy or imaging studies may be done to determine the cause of the infections.  How is this treated?  Treatment for this condition includes:  · Antibiotic medicine.  · Over-the-counter medicines to treat discomfort.  · Drinking enough water to stay hydrated.  If you have frequent infections or have other conditions such as a kidney stone, you may need to see a health care provider who specializes in the urinary tract (urologist).  In rare cases, urinary tract infections can cause sepsis. Sepsis is a life-threatening condition that occurs when the body responds to an infection. Sepsis is treated in the hospital with IV antibiotics, fluids, and other medicines.  Follow these instructions at home:    Medicines  · Take over-the-counter and prescription medicines only as told by your health care provider.  · If you were prescribed an antibiotic medicine, take it as told by your health care provider. Do not stop using the antibiotic even if you start to feel better.  General instructions  · Make sure you:  ? Empty your bladder often and completely. Do not hold urine for long periods of time.  ? Empty your bladder after sex.  ? Wipe from front to back after a bowel movement if you are female. Use each tissue one time when you wipe.  · Drink enough fluid to keep your urine pale yellow.  · Keep all follow-up  visits as told by your health care provider. This is important.  Contact a health care provider if:  · Your symptoms do not get better after 1-2 days.  · Your symptoms go away and then return.  Get help right away if you have:  · Severe pain in your back or your lower abdomen.  · A fever.  · Nausea or vomiting.  Summary  · A urinary tract infection (UTI) is an infection of any part of the urinary tract, which includes the kidneys, ureters, bladder, and urethra.  · Most urinary tract infections are caused by bacteria in your genital area, around the entrance to your urinary tract (urethra).  · Treatment for this condition often includes antibiotic medicines.  · If you were prescribed an antibiotic medicine, take it as told by your health care provider. Do not stop using the antibiotic even if you start to feel better.  · Keep all follow-up visits as told by your health care provider. This is important.  This information is not intended to replace advice given to you by your health care provider. Make sure you discuss any questions you have with your health care provider.  Document Revised: 12/05/2019 Document Reviewed: 06/27/2019  [employer] Patient Education © 2021 [employer]

## 2022-01-07 ENCOUNTER — OFFICE VISIT (OUTPATIENT)
Dept: FAMILY MEDICINE CLINIC | Facility: CLINIC | Age: 37
End: 2022-01-07

## 2022-01-07 VITALS
WEIGHT: 248 LBS | OXYGEN SATURATION: 98 % | TEMPERATURE: 97.1 F | DIASTOLIC BLOOD PRESSURE: 90 MMHG | SYSTOLIC BLOOD PRESSURE: 140 MMHG | BODY MASS INDEX: 36.62 KG/M2 | HEART RATE: 78 BPM

## 2022-01-07 DIAGNOSIS — R39.89 SUSPECTED UTI: Primary | ICD-10-CM

## 2022-01-07 LAB
BILIRUB BLD-MCNC: ABNORMAL MG/DL
CLARITY, POC: ABNORMAL
COLOR UR: ABNORMAL
GLUCOSE UR STRIP-MCNC: ABNORMAL MG/DL
KETONES UR QL: ABNORMAL
LEUKOCYTE EST, POC: ABNORMAL
NITRITE UR-MCNC: POSITIVE MG/ML
PH UR: 6.5 [PH] (ref 5–8)
PROT UR STRIP-MCNC: ABNORMAL MG/DL
RBC # UR STRIP: ABNORMAL /UL
SP GR UR: 1.02 (ref 1–1.03)
UROBILINOGEN UR QL: NORMAL

## 2022-01-07 PROCEDURE — 81002 URINALYSIS NONAUTO W/O SCOPE: CPT | Performed by: FAMILY MEDICINE

## 2022-01-07 PROCEDURE — 87086 URINE CULTURE/COLONY COUNT: CPT | Performed by: FAMILY MEDICINE

## 2022-01-07 PROCEDURE — 99213 OFFICE O/P EST LOW 20 MIN: CPT | Performed by: FAMILY MEDICINE

## 2022-01-07 RX ORDER — CIPROFLOXACIN 250 MG/1
250 TABLET, FILM COATED ORAL 2 TIMES DAILY
Qty: 6 TABLET | Refills: 0 | Status: SHIPPED | OUTPATIENT
Start: 2022-01-07 | End: 2022-01-10

## 2022-01-07 NOTE — PROGRESS NOTES
Chief Complaint    Urinary Tract Infection (Follow-up from [facility] on tues for UTI. Not better. )    Subjective      Annie Oshea presents to [facility]     History of Present Illness    1.) DYSURIA : Initially evaluated per  virtual care on 1.4.22. Presented with complaints of burning with urination x 3 days. Also reported frequency + gross hematuria. Treated with Macrobid (day 4/7) and Pyridium. Patient presents today reporting that she continues to experience sxs.    Objective      Vital Signs:     /90   Pulse 78   Temp 97.1 °F (36.2 °C)   Wt 112 kg (248 lb)   SpO2 98%   BMI 36.62 kg/m²       Physical Exam  Vitals reviewed.   Constitutional:       General: She is not in acute distress.     Appearance: Normal appearance. She is well-developed.   HENT:      Head: Normocephalic and atraumatic.      Right Ear: Hearing and external ear normal.      Left Ear: Hearing and external ear normal.      Nose: Nose normal.   Eyes:      General: Lids are normal.         Right eye: No discharge.         Left eye: No discharge.      Conjunctiva/sclera: Conjunctivae normal.   Pulmonary:      Effort: Pulmonary effort is normal.   Abdominal:      General: There is no distension.   Musculoskeletal:         General: No swelling.      Cervical back: Neck supple.   Skin:     Coloration: Skin is not jaundiced.      Findings: No erythema.   Neurological:      Mental Status: She is alert. Mental status is at baseline.   Psychiatric:         Mood and Affect: Mood and affect normal.         Thought Content: Thought content normal.     Assessment and Plan    Diagnoses and all orders for this visit:    1. Suspected UTI (Primary)  Comments:  1.) UA reviewed. Switched to Cipro. D/C Macrobid. Urine culture. Additional recs per review of culture.  Orders:  -     POCT urinalysis dipstick, manual  -     Urine Culture - Urine, Urine, Clean Catch    Other orders  -     ciprofloxacin (Cipro) 250 MG  tablet; Take 1 tablet by mouth 2 (Two) Times a Day for 3 days.  Dispense: 6 tablet; Refill: 0    Follow Up     Return if symptoms worsen or fail to improve.     Patient was given instructions and counseling regarding her condition or for health maintenance advice. Please see specific information pulled into the AVS if appropriate.

## 2022-01-08 LAB — BACTERIA SPEC AEROBE CULT: NO GROWTH

## 2022-01-09 PROCEDURE — U0004 COV-19 TEST NON-CDC HGH THRU: HCPCS | Performed by: PHYSICIAN ASSISTANT

## 2022-02-02 ENCOUNTER — TELEMEDICINE (OUTPATIENT)
Dept: FAMILY MEDICINE CLINIC | Facility: TELEHEALTH | Age: 37
End: 2022-02-02

## 2022-02-02 DIAGNOSIS — R39.89 SUSPECTED UTI: Primary | ICD-10-CM

## 2022-02-02 PROCEDURE — 99213 OFFICE O/P EST LOW 20 MIN: CPT | Performed by: NURSE PRACTITIONER

## 2022-02-02 RX ORDER — PHENAZOPYRIDINE HYDROCHLORIDE 200 MG/1
200 TABLET, FILM COATED ORAL 3 TIMES DAILY PRN
Qty: 6 TABLET | Refills: 0 | Status: SHIPPED | OUTPATIENT
Start: 2022-02-02 | End: 2022-02-04

## 2022-02-02 RX ORDER — CIPROFLOXACIN 250 MG/1
250 TABLET, FILM COATED ORAL 2 TIMES DAILY
Qty: 6 TABLET | Refills: 0 | Status: SHIPPED | OUTPATIENT
Start: 2022-02-02 | End: 2022-02-05

## 2022-02-02 NOTE — PROGRESS NOTES
You have chosen to receive care through a telehealth visit.  Do you consent to use a video/audio connection for your medical care today? Yes     CHIEF COMPLAINT  No chief complaint on file.        DAVIN Oshea is a 36 y.o. female  presents with complaint of dysuria, urgency, frequency, mild nausea.  Denies n/v, hematuria, vaginal symptoms, belly/back pain.  She has had 5 UTIs since last September.     Review of Systems   Constitutional: Negative for fever.   Gastrointestinal: Positive for nausea. Negative for abdominal pain.   Genitourinary: Positive for dysuria, frequency and urgency. Negative for flank pain and hematuria.   Musculoskeletal: Negative for back pain.       Past Medical History:   Diagnosis Date   • Anxiety    • Chiari I malformation (HCC)    • Depression    • Gestational diabetes    • Hashimoto's disease    • Hypothyroid    • Kidney stones    • Pre-eclampsia        Family History   Problem Relation Age of Onset   • Depression Mother    • Hypertension Mother    • Arthritis Mother    • Hypertension Father    • Arthritis Sister    • Asthma Brother    • Depression Maternal Grandmother    • Diabetes type II Maternal Grandmother    • Hypertension Maternal Grandmother    • Arthritis Maternal Grandmother    • Diabetes type II Maternal Grandfather    • Hypertension Paternal Grandmother    • Alcohol abuse Paternal Grandfather    • Heart disease Other        Social History     Socioeconomic History   • Marital status: Other   Tobacco Use   • Smoking status: Never Smoker   • Smokeless tobacco: Never Used   Vaping Use   • Vaping Use: Never used   Substance and Sexual Activity   • Alcohol use: Not Currently     Comment: Every day 7 or less drinks per week   • Drug use: Never   • Sexual activity: Defer         There were no vitals taken for this visit.    PHYSICAL EXAM  Physical Exam   Constitutional: She is oriented to person, place, and time. She appears well-developed and well-nourished. She does not have a  sickly appearance. She does not appear ill.   HENT:   Head: Normocephalic and atraumatic.   Pulmonary/Chest: Effort normal.  No respiratory distress.  Neurological: She is alert and oriented to person, place, and time.         Diagnoses and all orders for this visit:    1. Suspected UTI (Primary)  -     ciprofloxacin (Cipro) 250 MG tablet; Take 1 tablet by mouth 2 (Two) Times a Day for 3 days.  Dispense: 6 tablet; Refill: 0  -     phenazopyridine (PYRIDIUM) 200 MG tablet; Take 1 tablet by mouth 3 (Three) Times a Day As Needed for Bladder Spasms for up to 2 days.  Dispense: 6 tablet; Refill: 0    --take medications as prescribed  --f/u in 2-3 days if no improvement      FOLLOW-UP  As discussed during visit with PCP/[facility] if no improvement or Urgent Care/Emergency Department if worsening of symptoms    Patient verbalizes understanding of medication dosage, comfort measures, instructions for treatment and follow-up.    JOSE A Moreira  02/02/2022  18:21 EST    This visit was performed via Telehealth.  This patient has been instructed to follow-up with their primary care provider if their symptoms worsen or the treatment provided does not resolve their illness.

## 2022-02-02 NOTE — PATIENT INSTRUCTIONS
Urinary Tract Infection, Adult    A urinary tract infection (UTI) is an infection of any part of the urinary tract. The urinary tract includes the kidneys, ureters, bladder, and urethra. These organs make, store, and get rid of urine in the body.  Your health care provider may use other names to describe the infection. An upper UTI affects the ureters and kidneys (pyelonephritis). A lower UTI affects the bladder (cystitis) and urethra (urethritis).  What are the causes?  Most urinary tract infections are caused by bacteria in your genital area, around the entrance to your urinary tract (urethra). These bacteria grow and cause inflammation of your urinary tract.  What increases the risk?  You are more likely to develop this condition if:  · You have a urinary catheter that stays in place (indwelling).  · You are not able to control when you urinate or have a bowel movement (you have incontinence).  · You are female and you:  ? Use a spermicide or diaphragm for birth control.  ? Have low estrogen levels.  ? Are pregnant.  · You have certain genes that increase your risk (genetics).  · You are sexually active.  · You take antibiotic medicines.  · You have a condition that causes your flow of urine to slow down, such as:  ? An enlarged prostate, if you are male.  ? Blockage in your urethra (stricture).  ? A kidney stone.  ? A nerve condition that affects your bladder control (neurogenic bladder).  ? Not getting enough to drink, or not urinating often.  · You have certain medical conditions, such as:  ? Diabetes.  ? A weak disease-fighting system (immunesystem).  ? Sickle cell disease.  ? Gout.  ? Spinal cord injury.  What are the signs or symptoms?  Symptoms of this condition include:  · Needing to urinate right away (urgently).  · Frequent urination or passing small amounts of urine frequently.  · Pain or burning with urination.  · Blood in the urine.  · Urine that smells bad or unusual.  · Trouble urinating.  · Cloudy  urine.  · Vaginal discharge, if you are female.  · Pain in the abdomen or the lower back.  You may also have:  · Vomiting or a decreased appetite.  · Confusion.  · Irritability or tiredness.  · A fever.  · Diarrhea.  The first symptom in older adults may be confusion. In some cases, they may not have any symptoms until the infection has worsened.  How is this diagnosed?  This condition is diagnosed based on your medical history and a physical exam. You may also have other tests, including:  · Urine tests.  · Blood tests.  · Tests for sexually transmitted infections (STIs).  If you have had more than one UTI, a cystoscopy or imaging studies may be done to determine the cause of the infections.  How is this treated?  Treatment for this condition includes:  · Antibiotic medicine.  · Over-the-counter medicines to treat discomfort.  · Drinking enough water to stay hydrated.  If you have frequent infections or have other conditions such as a kidney stone, you may need to see a health care provider who specializes in the urinary tract (urologist).  In rare cases, urinary tract infections can cause sepsis. Sepsis is a life-threatening condition that occurs when the body responds to an infection. Sepsis is treated in the hospital with IV antibiotics, fluids, and other medicines.  Follow these instructions at home:    Medicines  · Take over-the-counter and prescription medicines only as told by your health care provider.  · If you were prescribed an antibiotic medicine, take it as told by your health care provider. Do not stop using the antibiotic even if you start to feel better.  General instructions  · Make sure you:  ? Empty your bladder often and completely. Do not hold urine for long periods of time.  ? Empty your bladder after sex.  ? Wipe from front to back after a bowel movement if you are female. Use each tissue one time when you wipe.  · Drink enough fluid to keep your urine pale yellow.  · Keep all follow-up  visits as told by your health care provider. This is important.  Contact a health care provider if:  · Your symptoms do not get better after 1-2 days.  · Your symptoms go away and then return.  Get help right away if you have:  · Severe pain in your back or your lower abdomen.  · A fever.  · Nausea or vomiting.  Summary  · A urinary tract infection (UTI) is an infection of any part of the urinary tract, which includes the kidneys, ureters, bladder, and urethra.  · Most urinary tract infections are caused by bacteria in your genital area, around the entrance to your urinary tract (urethra).  · Treatment for this condition often includes antibiotic medicines.  · If you were prescribed an antibiotic medicine, take it as told by your health care provider. Do not stop using the antibiotic even if you start to feel better.  · Keep all follow-up visits as told by your health care provider. This is important.  This information is not intended to replace advice given to you by your health care provider. Make sure you discuss any questions you have with your health care provider.  Document Revised: 12/05/2019 Document Reviewed: 06/27/2019  [employer] Interactive Patient Education © 2021 [employer]

## 2022-02-11 ENCOUNTER — OFFICE VISIT (OUTPATIENT)
Dept: FAMILY MEDICINE CLINIC | Facility: CLINIC | Age: 37
End: 2022-02-11

## 2022-02-11 VITALS
SYSTOLIC BLOOD PRESSURE: 130 MMHG | BODY MASS INDEX: 36.73 KG/M2 | WEIGHT: 248 LBS | DIASTOLIC BLOOD PRESSURE: 84 MMHG | OXYGEN SATURATION: 99 % | TEMPERATURE: 97.2 F | HEART RATE: 80 BPM | HEIGHT: 69 IN

## 2022-02-11 DIAGNOSIS — N39.0 FREQUENT UTI: Primary | ICD-10-CM

## 2022-02-11 DIAGNOSIS — E06.3 HYPOTHYROIDISM DUE TO HASHIMOTO'S THYROIDITIS: ICD-10-CM

## 2022-02-11 DIAGNOSIS — Z09 FOLLOW UP: ICD-10-CM

## 2022-02-11 DIAGNOSIS — R35.0 URINARY FREQUENCY: ICD-10-CM

## 2022-02-11 DIAGNOSIS — R30.0 DYSURIA: ICD-10-CM

## 2022-02-11 DIAGNOSIS — E03.8 HYPOTHYROIDISM DUE TO HASHIMOTO'S THYROIDITIS: ICD-10-CM

## 2022-02-11 DIAGNOSIS — R31.29 MICROSCOPIC HEMATURIA: ICD-10-CM

## 2022-02-11 DIAGNOSIS — R39.15 URINARY URGENCY: ICD-10-CM

## 2022-02-11 LAB
BILIRUB BLD-MCNC: NEGATIVE MG/DL
CLARITY, POC: CLEAR
COLOR UR: YELLOW
EXPIRATION DATE: ABNORMAL
GLUCOSE UR STRIP-MCNC: NEGATIVE MG/DL
KETONES UR QL: NEGATIVE
LEUKOCYTE EST, POC: ABNORMAL
Lab: ABNORMAL
NITRITE UR-MCNC: NEGATIVE MG/ML
PH UR: 6.5 [PH] (ref 5–8)
PROT UR STRIP-MCNC: ABNORMAL MG/DL
RBC # UR STRIP: ABNORMAL /UL
SP GR UR: 1.02 (ref 1–1.03)
T4 FREE SERPL-MCNC: 1.15 NG/DL (ref 0.93–1.7)
TSH SERPL DL<=0.05 MIU/L-ACNC: 3.77 UIU/ML (ref 0.27–4.2)
UROBILINOGEN UR QL: NORMAL

## 2022-02-11 PROCEDURE — 84439 ASSAY OF FREE THYROXINE: CPT | Performed by: NURSE PRACTITIONER

## 2022-02-11 PROCEDURE — 99214 OFFICE O/P EST MOD 30 MIN: CPT | Performed by: NURSE PRACTITIONER

## 2022-02-11 PROCEDURE — 36415 COLL VENOUS BLD VENIPUNCTURE: CPT | Performed by: NURSE PRACTITIONER

## 2022-02-11 PROCEDURE — 87086 URINE CULTURE/COLONY COUNT: CPT | Performed by: NURSE PRACTITIONER

## 2022-02-11 PROCEDURE — 84443 ASSAY THYROID STIM HORMONE: CPT | Performed by: NURSE PRACTITIONER

## 2022-02-11 PROCEDURE — 81003 URINALYSIS AUTO W/O SCOPE: CPT | Performed by: NURSE PRACTITIONER

## 2022-02-11 RX ORDER — NITROFURANTOIN 25; 75 MG/1; MG/1
100 CAPSULE ORAL 2 TIMES DAILY
Qty: 14 CAPSULE | Refills: 0 | Status: SHIPPED | OUTPATIENT
Start: 2022-02-11 | End: 2022-04-06

## 2022-02-11 RX ORDER — PHENAZOPYRIDINE HYDROCHLORIDE 200 MG/1
200 TABLET, FILM COATED ORAL 3 TIMES DAILY PRN
Qty: 10 TABLET | Refills: 0 | Status: SHIPPED | OUTPATIENT
Start: 2022-02-11 | End: 2022-04-06

## 2022-02-11 NOTE — PROGRESS NOTES
Venipuncture Blood Specimen Collection  Venipuncture performed in left arm by Shira Kimball with good hemostasis. Patient tolerated the procedure well without complications.   02/11/22   Shira Kimball

## 2022-02-11 NOTE — PROGRESS NOTES
Chief Complaint  Difficulty Urinating    Orlando Oshea presents to [facility]  History of Present Illness     Patient presents to the office today due to concerns for frequent urinary tract infection.  She states that she has been seen several times for UTIs in the past 6 months.  She was seen in August, January x2, and now twice in February.  She was treated for suspected UTI via telemedicine on February 2 with ciprofloxacin.  She has taken the medication but continues to have burning, urgency, and frequency.  In January she was treated with ciprofloxacin as well.    She currently denies any fever, chills, or body aches.  She denies any lower back pain or abdominal pain.  Denies nausea or vomiting.  She denies any gross hematuria.      Past Medical History:   Diagnosis Date   • Anxiety    • Chiari I malformation (HCC)    • Depression    • Gestational diabetes    • Hashimoto's disease    • Hypothyroid    • Kidney stones    • Pre-eclampsia        No Known Allergies     Past Surgical History:   Procedure Laterality Date   • KIDNEY STONE SURGERY     • MOLE REMOVAL     • WISDOM TOOTH EXTRACTION          Social History     Tobacco Use   • Smoking status: Never Smoker   • Smokeless tobacco: Never Used   Vaping Use   • Vaping Use: Never used   Substance Use Topics   • Alcohol use: Not Currently     Comment: Every day 7 or less drinks per week   • Drug use: Never       Family History   Problem Relation Age of Onset   • Depression Mother    • Hypertension Mother    • Arthritis Mother    • Hypertension Father    • Arthritis Sister    • Asthma Brother    • Depression Maternal Grandmother    • Diabetes type II Maternal Grandmother    • Hypertension Maternal Grandmother    • Arthritis Maternal Grandmother    • Diabetes type II Maternal Grandfather    • Hypertension Paternal Grandmother    • Alcohol abuse Paternal Grandfather    • Heart disease Other         Health  "Maintenance Due   Topic Date Due   • ANNUAL PHYSICAL  Never done   • COVID-19 Vaccine (1) Never done   • TDAP/TD VACCINES (1 - Tdap) Never done   • HEPATITIS C SCREENING  Never done   • PAP SMEAR  Never done   • INFLUENZA VACCINE  08/01/2021        Current Outpatient Medications on File Prior to Visit   Medication Sig   • etonogestrel-ethinyl estradiol (EluRyng) 0.12-0.015 MG/24HR vaginal ring    • levothyroxine (Synthroid) 50 MCG tablet Take 1 tablet by mouth Every Morning.   • multivitamin with minerals (WOMENS ONE DAILY PO)    • sertraline (ZOLOFT) 50 MG tablet Take 1 tablet by mouth Daily.     No current facility-administered medications on file prior to visit.       Immunization History   Administered Date(s) Administered   • Influenza, Unspecified 11/07/2019, 09/24/2020       Review of Systems     Objective     /84   Pulse 80   Temp 97.2 °F (36.2 °C)   Ht 175.3 cm (69\")   Wt 112 kg (248 lb)   SpO2 99%   BMI 36.62 kg/m²       Physical Exam  Vitals reviewed.   Constitutional:       General: She is not in acute distress.     Appearance: She is well-developed. She is obese.   HENT:      Head: Normocephalic and atraumatic.   Eyes:      General: No scleral icterus.     Conjunctiva/sclera: Conjunctivae normal.   Cardiovascular:      Rate and Rhythm: Normal rate and regular rhythm.      Pulses: Normal pulses.      Heart sounds: No murmur heard.      Pulmonary:      Effort: Pulmonary effort is normal.      Breath sounds: Normal breath sounds. No wheezing or rhonchi.   Abdominal:      General: Bowel sounds are normal. There is no distension.      Palpations: Abdomen is soft. There is no mass.      Tenderness: There is no abdominal tenderness. There is no right CVA tenderness, left CVA tenderness, guarding or rebound.   Musculoskeletal:         General: Normal range of motion.   Skin:     General: Skin is warm and dry.   Neurological:      Mental Status: She is alert and oriented to person, place, and time. "   Psychiatric:         Mood and Affect: Mood and affect normal.         Behavior: Behavior normal.         Thought Content: Thought content normal.         Judgment: Judgment normal.         Result Review :     The following data was reviewed by: JOSE A Fernando on 02/11/2022:  POCT urinalysis dipstick, manual (08/11/2021 09:29)  Urine Culture - Urine, Urine, Clean Catch (08/11/2021 12:00)    POCT urinalysis dipstick, manual (01/07/2022 10:58)  Urine Culture - Urine, Urine, Clean Catch (01/07/2022 12:04)    POCT urinalysis dipstick, automated (02/11/2022 09:17)  Urine Culture - Urine, Urine, Clean Catch (02/11/2022 09:21)      Data reviewed: :   Office Visit with Yuliana Potter APRN (08/11/2021)  Telemedicine with Harriet Jordan APRN (01/04/2022)  Office Visit with Nel Silverman DO (01/07/2022)  Telemedicine with Alyce Eaton APRN (02/02/2022)         Assessment and Plan      Diagnoses and all orders for this visit:    1. Frequent UTI (Primary)  -     Ambulatory Referral to Urology    2. Microscopic hematuria  -     Ambulatory Referral to Urology  -     Urine Culture - Urine, Urine, Clean Catch  -     nitrofurantoin, macrocrystal-monohydrate, (Macrobid) 100 MG capsule; Take 1 capsule by mouth 2 (Two) Times a Day.  Dispense: 14 capsule; Refill: 0    3. Dysuria  -     POCT urinalysis dipstick, automated  -     Urine Culture - Urine, Urine, Clean Catch  -     nitrofurantoin, macrocrystal-monohydrate, (Macrobid) 100 MG capsule; Take 1 capsule by mouth 2 (Two) Times a Day.  Dispense: 14 capsule; Refill: 0  -     phenazopyridine (Pyridium) 200 MG tablet; Take 1 tablet by mouth 3 (Three) Times a Day As Needed for Bladder Spasms.  Dispense: 10 tablet; Refill: 0    4. Urinary urgency  -     POCT urinalysis dipstick, automated  -     Urine Culture - Urine, Urine, Clean Catch  -     nitrofurantoin, macrocrystal-monohydrate, (Macrobid) 100 MG capsule; Take 1 capsule by mouth 2 (Two) Times a  Day.  Dispense: 14 capsule; Refill: 0    5. Urinary frequency  -     POCT urinalysis dipstick, automated  -     Urine Culture - Urine, Urine, Clean Catch  -     nitrofurantoin, macrocrystal-monohydrate, (Macrobid) 100 MG capsule; Take 1 capsule by mouth 2 (Two) Times a Day.  Dispense: 14 capsule; Refill: 0    6. Follow up  -     TSH+Free T4    7. Hypothyroidism due to Hashimoto's thyroiditis  -     TSH+Free T4            Follow Up     Return for Annual physical with PCP in 1 to 2 weeks.  TSH with free T4 from August obtained at visit today.    Urinalysis shows microscopic hematuria and leukocytes.  We will send this for culture.  Her most recent culture from January was negative.  Advised patient to see urology for further work-up due to concerns for frequent urinary tract infection.  Discussed other potential causes including interstitial cystitis.  Voiced agreement.    Patient was given instructions and counseling regarding her condition or for health maintenance advice. Please see specific information pulled into the AVS if appropriate.     Annie Oshea  reports that she has never smoked. She has never used smokeless tobacco.

## 2022-02-12 LAB — BACTERIA SPEC AEROBE CULT: NORMAL

## 2022-04-06 ENCOUNTER — OFFICE VISIT (OUTPATIENT)
Dept: FAMILY MEDICINE CLINIC | Facility: CLINIC | Age: 37
End: 2022-04-06

## 2022-04-06 VITALS
HEIGHT: 70 IN | BODY MASS INDEX: 35.93 KG/M2 | OXYGEN SATURATION: 96 % | TEMPERATURE: 97.3 F | DIASTOLIC BLOOD PRESSURE: 72 MMHG | HEART RATE: 89 BPM | WEIGHT: 251 LBS | SYSTOLIC BLOOD PRESSURE: 136 MMHG

## 2022-04-06 DIAGNOSIS — Z11.59 ENCOUNTER FOR HEPATITIS C SCREENING TEST FOR LOW RISK PATIENT: ICD-10-CM

## 2022-04-06 DIAGNOSIS — Z00.00 ANNUAL PHYSICAL EXAM: Primary | ICD-10-CM

## 2022-04-06 DIAGNOSIS — R00.2 PALPITATIONS: ICD-10-CM

## 2022-04-06 DIAGNOSIS — E06.3 HYPOTHYROIDISM DUE TO HASHIMOTO'S THYROIDITIS: ICD-10-CM

## 2022-04-06 DIAGNOSIS — E03.8 HYPOTHYROIDISM DUE TO HASHIMOTO'S THYROIDITIS: ICD-10-CM

## 2022-04-06 DIAGNOSIS — Z71.85 VACCINE COUNSELING: ICD-10-CM

## 2022-04-06 DIAGNOSIS — E66.9 CLASS 2 OBESITY WITHOUT SERIOUS COMORBIDITY WITH BODY MASS INDEX (BMI) OF 36.0 TO 36.9 IN ADULT, UNSPECIFIED OBESITY TYPE: ICD-10-CM

## 2022-04-06 DIAGNOSIS — R31.9 HEMATURIA, UNSPECIFIED TYPE: ICD-10-CM

## 2022-04-06 DIAGNOSIS — Z82.49 FAMILY HISTORY OF EARLY CAD: ICD-10-CM

## 2022-04-06 DIAGNOSIS — I99.8 FLUCTUATING BLOOD PRESSURE: ICD-10-CM

## 2022-04-06 DIAGNOSIS — R06.02 SOB (SHORTNESS OF BREATH) ON EXERTION: ICD-10-CM

## 2022-04-06 PROBLEM — E66.812 CLASS 2 OBESITY WITHOUT SERIOUS COMORBIDITY WITH BODY MASS INDEX (BMI) OF 36.0 TO 36.9 IN ADULT: Status: ACTIVE | Noted: 2022-04-06

## 2022-04-06 LAB
BILIRUB BLD-MCNC: NEGATIVE MG/DL
CLARITY, POC: CLEAR
COLOR UR: YELLOW
GLUCOSE UR STRIP-MCNC: NEGATIVE MG/DL
KETONES UR QL: NEGATIVE
LEUKOCYTE EST, POC: NEGATIVE
NITRITE UR-MCNC: NEGATIVE MG/ML
PH UR: 6 [PH] (ref 5–8)
PROT UR STRIP-MCNC: NEGATIVE MG/DL
RBC # UR STRIP: ABNORMAL /UL
SP GR UR: 1.01 (ref 1–1.03)
UROBILINOGEN UR QL: NORMAL

## 2022-04-06 PROCEDURE — 81002 URINALYSIS NONAUTO W/O SCOPE: CPT | Performed by: NURSE PRACTITIONER

## 2022-04-06 PROCEDURE — 90715 TDAP VACCINE 7 YRS/> IM: CPT | Performed by: NURSE PRACTITIONER

## 2022-04-06 PROCEDURE — 87086 URINE CULTURE/COLONY COUNT: CPT | Performed by: NURSE PRACTITIONER

## 2022-04-06 PROCEDURE — 99395 PREV VISIT EST AGE 18-39: CPT | Performed by: NURSE PRACTITIONER

## 2022-04-06 PROCEDURE — 90471 IMMUNIZATION ADMIN: CPT | Performed by: NURSE PRACTITIONER

## 2022-04-06 RX ORDER — LEVOTHYROXINE SODIUM 0.05 MG/1
50 TABLET ORAL
Qty: 30 TABLET | Refills: 5 | Status: SHIPPED | OUTPATIENT
Start: 2022-04-06 | End: 2023-01-24 | Stop reason: SDUPTHER

## 2022-04-06 NOTE — PROGRESS NOTES
Chief Complaint  Annual Exam (Just a good check up)    Subjective            Annie Oshea presents to [facility]  Pt here today for the annul PE and sees a GYN--    And pt reports she has a Hx of heart conditions  in her family--and the BP been elevated at the urgent cares--[facility] CHF and HTN and A Fib, then father HTN and mother HTN --and MGF was CABG--and both parents with HTN Dx'd prior to age 50--MGF and MGM DM  And aunt as well --and pt was gestational DM    Also pt reports experienced wt gain and would like to exercise but very concerned with regards to some s/s-went througha relational stress and pt admits wasn't correctly       Also missed her thyroid meds for approx 1 month    Then also off the zoloft approx 1 month and would like to see if can stay off --pt is seeing therapy      Pt was working really hard last yr and was down from 230# to 214# then felt good and then  Stress of the relational breakup and then gained upwards 40#       PHQ-2 Total Score:    PHQ-9 Total Score:      Past Medical History:   Diagnosis Date   • Anxiety    • Chiari I malformation (HCC)    • Depression    • Gestational diabetes    • Hashimoto's disease    • Hypothyroid    • Kidney stones    • Pre-eclampsia        No Known Allergies     Past Surgical History:   Procedure Laterality Date   • KIDNEY STONE SURGERY     • MOLE REMOVAL     • WISDOM TOOTH EXTRACTION          Social History     Tobacco Use   • Smoking status: Never Smoker   • Smokeless tobacco: Never Used   Vaping Use   • Vaping Use: Never used   Substance Use Topics   • Alcohol use: Not Currently     Comment: Every day 7 or less drinks per week   • Drug use: Never       Family History   Problem Relation Age of Onset   • Depression Mother    • Hypertension Mother    • Arthritis Mother    • Hypertension Father    • Arthritis Sister    • Asthma Brother    • Depression Maternal Grandmother    • Diabetes type II Maternal Grandmother    •  Hypertension Maternal Grandmother    • Arthritis Maternal Grandmother    • Diabetes type II Maternal Grandfather    • Hypertension Paternal Grandmother    • Alcohol abuse Paternal Grandfather    • Heart disease Other         Health Maintenance Due   Topic Date Due   • COVID-19 Vaccine (1) 08/30/1990   • HEPATITIS C SCREENING  Never done   • PAP SMEAR  Never done        Current Outpatient Medications on File Prior to Visit   Medication Sig   • etonogestrel-ethinyl estradiol (NUVARING) 0.12-0.015 MG/24HR vaginal ring    • multivitamin with minerals tablet tablet    • sertraline (ZOLOFT) 50 MG tablet Take 1 tablet by mouth Daily.   • [DISCONTINUED] levothyroxine (Synthroid) 50 MCG tablet Take 1 tablet by mouth Every Morning.   • [DISCONTINUED] nitrofurantoin, macrocrystal-monohydrate, (Macrobid) 100 MG capsule Take 1 capsule by mouth 2 (Two) Times a Day.   • [DISCONTINUED] phenazopyridine (Pyridium) 200 MG tablet Take 1 tablet by mouth 3 (Three) Times a Day As Needed for Bladder Spasms.     No current facility-administered medications on file prior to visit.       Immunization History   Administered Date(s) Administered   • COVID-19 (MODERNA) BOOSTER 03/10/2021, 04/07/2021   • Flu Vaccine Quad PF >36MO 10/27/2021   • Influenza, Unspecified 11/07/2019, 09/24/2020   • Tdap 04/06/2022       Review of Systems   Constitutional: Positive for fatigue and unexpected weight gain.   HENT: Negative.    Eyes: Negative.  Negative for blurred vision and double vision.   Respiratory: Positive for cough and shortness of breath.         Cough of the morning    Cardiovascular: Positive for palpitations. Negative for chest pain and leg swelling.        One month ago experienced a heaviness of the chest when laying in the bed at that time and then lasted approx 1 day and happened 1 month ago    Gastrointestinal: Positive for nausea and indigestion. Negative for abdominal pain, vomiting and GERD.        Heartburn    Endocrine: Positive for  "cold intolerance and polydipsia. Negative for heat intolerance, polyphagia and polyuria.   Genitourinary: Negative for dysuria, frequency and urgency.   Musculoskeletal: Positive for back pain.        Sees the chiropractor    Skin: Negative for rash and wound.   Allergic/Immunologic: Positive for environmental allergies.   Neurological: Negative for dizziness, syncope and light-headedness.   Hematological: Does not bruise/bleed easily.   Psychiatric/Behavioral: Positive for stress. Negative for self-injury, suicidal ideas and depressed mood. The patient is nervous/anxious.         Kids gone on spring break and a recent life stressors         Objective     /72 (BP Location: Right arm, Patient Position: Sitting, Cuff Size: Adult)   Pulse 89   Temp 97.3 °F (36.3 °C) (Temporal)   Ht 176.5 cm (69.5\")   Wt 114 kg (251 lb)   SpO2 96%   BMI 36.53 kg/m²       Physical Exam  Vitals and nursing note reviewed.   Constitutional:       Appearance: Normal appearance. She is obese.   HENT:      Head: Normocephalic.      Right Ear: Tympanic membrane, ear canal and external ear normal.      Left Ear: Tympanic membrane, ear canal and external ear normal.      Nose: Nose normal.      Mouth/Throat:      Mouth: Mucous membranes are moist.   Eyes:      Pupils: Pupils are equal, round, and reactive to light.   Neck:      Comments: Mildly swelled pt been off meds x 1 month and pt will resume   Cardiovascular:      Rate and Rhythm: Normal rate and regular rhythm.      Heart sounds: Normal heart sounds.   Pulmonary:      Effort: Pulmonary effort is normal.      Breath sounds: Normal breath sounds.   Abdominal:      General: Bowel sounds are normal.      Palpations: Abdomen is soft.      Tenderness: There is no abdominal tenderness.   Musculoskeletal:         General: Normal range of motion.      Cervical back: Normal range of motion and neck supple.   Skin:     General: Skin is warm and dry.   Neurological:      Mental Status: She " is alert and oriented to person, place, and time.   Psychiatric:         Mood and Affect: Mood normal.         Behavior: Behavior normal.         Thought Content: Thought content normal.         Judgment: Judgment normal.         Result Review :                          Assessment and Plan      Diagnoses and all orders for this visit:    1. Annual physical exam (Primary)  -     POCT urinalysis dipstick, manual  -     Lipid Panel  -     Comprehensive Metabolic Panel  -     CBC (No Diff)  -     Tdap Vaccine Greater Than or Equal To 8yo IM    2. Fluctuating blood pressure  -     Ambulatory Referral to Cardiology  -     Lipid Panel  -     Comprehensive Metabolic Panel  -     CBC (No Diff)    3. SOB (shortness of breath) on exertion  -     Ambulatory Referral to Cardiology  -     Lipid Panel  -     Comprehensive Metabolic Panel  -     CBC (No Diff)    4. Palpitations  -     Ambulatory Referral to Cardiology  -     Lipid Panel  -     Comprehensive Metabolic Panel  -     CBC (No Diff)    5. Family history of early CAD  -     Ambulatory Referral to Cardiology  -     Lipid Panel  -     Comprehensive Metabolic Panel  -     CBC (No Diff)    6. Hypothyroidism due to Hashimoto's thyroiditis  -     levothyroxine (Synthroid) 50 MCG tablet; Take 1 tablet by mouth Every Morning.  Dispense: 30 tablet; Refill: 5    7. Encounter for hepatitis C screening test for low risk patient  -     Hepatitis C Antibody    8. Vaccine counseling  -     Tdap Vaccine Greater Than or Equal To 8yo IM    9. Class 2 obesity without serious comorbidity with body mass index (BMI) of 36.0 to 36.9 in adult, unspecified obesity type    10. Hematuria, unspecified type  -     Urine Culture - Urine, Urine, Clean Catch    pt will let me know if she decided to refill the zoloft    Counseled weight loss and dietary changes and activity increase and water increase during visit        Follow Up     Return in about 6 months (around 10/6/2022), or if symptoms worsen or  fail to improve.

## 2022-04-08 LAB — BACTERIA SPEC AEROBE CULT: NORMAL

## 2022-04-11 NOTE — PROGRESS NOTES
Please mail letter to patient stating    Annie, the urine culture showed mixed altaf and if you are having any persistent symptoms of a bladder infection or urinary tract infection it suggest that we do a recollection please

## 2022-04-22 ENCOUNTER — OFFICE VISIT (OUTPATIENT)
Dept: CARDIOLOGY | Facility: CLINIC | Age: 37
End: 2022-04-22

## 2022-04-22 VITALS
DIASTOLIC BLOOD PRESSURE: 90 MMHG | WEIGHT: 256 LBS | HEART RATE: 89 BPM | BODY MASS INDEX: 36.65 KG/M2 | HEIGHT: 70 IN | SYSTOLIC BLOOD PRESSURE: 120 MMHG

## 2022-04-22 DIAGNOSIS — R00.2 PALPITATIONS: Primary | ICD-10-CM

## 2022-04-22 DIAGNOSIS — R06.09 EXERTIONAL DYSPNEA: ICD-10-CM

## 2022-04-22 DIAGNOSIS — I42.8 NON-ISCHEMIC CARDIOMYOPATHY: ICD-10-CM

## 2022-04-22 PROCEDURE — 99204 OFFICE O/P NEW MOD 45 MIN: CPT | Performed by: INTERNAL MEDICINE

## 2022-04-22 PROCEDURE — 93000 ELECTROCARDIOGRAM COMPLETE: CPT | Performed by: INTERNAL MEDICINE

## 2022-04-22 NOTE — PROGRESS NOTES
[facility]      Patient Name: Annie Oshea  :1985  Age: 36 y.o.  Encounter Provider:  Brian Black Jr, MD      Chief Complaint:   Chief Complaint   Patient presents with   • Palpitations         HPI  Annie Oshea is a 36 y.o. female past medical history of hypertension, GERD and Hashimoto's thyroiditis who presents for initial evaluation of multiple cardiac complaints.  She notes increased blood pressure and exertional dyspnea over the past few months.  She states that she had a cardiac work-up about 12 years ago at which time an echocardiogram was done and she was told she had a borderline enlarged left ventricle.  It was at the time of the birth of her youngest child.  She has had no cardiac follow-up since that timeframe.  She notes a longstanding history of palpitations but states that all symptoms have been increased over the last few months.  She has a significant increase in social stressors.  She is a single mother of 3.  She had a bad break-up with his significant other over the last year and is gained about 40 pounds.  She wants to get back into increased exercise regimen but is concerned given recent symptoms.  She is a lifelong non-smoker drinks socially and denies illicit drug use.  She has no history of first-degree relatives with significant coronary disease but paternal grandfather had a CABG in his 70s and maternal grandmother with had history of heart failure in her 60s of uncertain etiology.      The following portions of the patient's history were reviewed and updated as appropriate: allergies, current medications, past family history, past medical history, past social history, past surgical history and problem list.      Review of Systems   Constitutional: Negative for chills and fever.   HENT: Negative for hoarse voice and sore throat.    Eyes: Negative for double vision and photophobia.   Cardiovascular: Positive for dyspnea on exertion and palpitations.  "Negative for chest pain, leg swelling, near-syncope, orthopnea, paroxysmal nocturnal dyspnea and syncope.   Respiratory: Negative for cough and wheezing.    Skin: Negative for poor wound healing and rash.   Musculoskeletal: Negative for arthritis and joint swelling.   Gastrointestinal: Negative for bloating, abdominal pain, hematemesis and hematochezia.   Neurological: Negative for dizziness and focal weakness.   Psychiatric/Behavioral: Negative for depression and suicidal ideas.       OBJECTIVE:   Vital Signs  Vitals:    04/22/22 1504   BP: 120/90   Pulse: 89     Estimated body mass index is 37.26 kg/m² as calculated from the following:    Height as of this encounter: 176.5 cm (69.5\").    Weight as of this encounter: 116 kg (256 lb).    Vitals reviewed.   Constitutional:       Appearance: Healthy appearance. Not in distress.   Neck:      Vascular: No JVR. JVD normal.   Pulmonary:      Effort: Pulmonary effort is normal.      Breath sounds: Normal breath sounds. No wheezing. No rhonchi. No rales.   Chest:      Chest wall: Not tender to palpatation.   Cardiovascular:      PMI at left midclavicular line. Normal rate. Regular rhythm. Normal S1. Normal S2.      Murmurs: There is no murmur.      No gallop. No click. No rub.   Pulses:     Intact distal pulses.   Edema:     Peripheral edema absent.   Abdominal:      General: Bowel sounds are normal.      Palpations: Abdomen is soft.      Tenderness: There is no abdominal tenderness.   Musculoskeletal: Normal range of motion.         General: No tenderness. Skin:     General: Skin is warm and dry.   Neurological:      General: No focal deficit present.      Mental Status: Alert and oriented to person, place and time.           ECG 12 Lead    Date/Time: 4/22/2022 8:38 PM  Performed by: Brian Black Jr., MD  Authorized by: Brian Black Jr., MD   Comparison: not compared with previous ECG   Previous ECG: no previous ECG available  Rhythm: sinus rhythm    Clinical " impression: normal ECG                  ASSESSMENT:     Exertional dyspnea  Elevated blood pressure without diagnosis of hypertension  Palpitations  Hashimoto's thyroiditis  Cardiomyopathy    PLAN OF CARE:     1. Cardiomyopathy -patient was told that she had borderline enlarged heart after last pregnancy.  Given dyspnea on exertion we will plan for echocardiogram.  2. Exertional dyspnea -etiology uncertain likely multifactorial.  Patient is wanting to get back into structured exercise regimen.  Given family history of coronary artery disease plan for treadmill stress study.  3. Palpitations -rare episodes with no dizziness or syncope.  Will monitor clinical progress and if increased frequency and intensity plan for ambulatory telemetry.  4. Elevated blood pressure without diagnosis of hypertension -sodium restricted diet is counseled.  Twice daily blood pressure log.  5. Hashimoto's thyroiditis    Return to clinic 3 months             Discharge Medications          Accurate as of April 22, 2022  3:07 PM. If you have any questions, ask your nurse or doctor.            Continue These Medications      Instructions Start Date   etonogestrel-ethinyl estradiol 0.12-0.015 MG/24HR vaginal ring  Commonly known as: NUVARING   No dose, route, or frequency recorded.      levothyroxine 50 MCG tablet  Commonly known as: Synthroid   50 mcg, Oral, Every Early Morning      multivitamin with minerals tablet tablet   No dose, route, or frequency recorded.      sertraline 50 MG tablet  Commonly known as: ZOLOFT   50 mg, Oral, Daily             Thank you for allowing me to participate in the care of your patient,      Sincerely,   Brian Black MD  [facility]  04/22/22  15:07 EDT

## 2022-05-04 ENCOUNTER — TELEPHONE (OUTPATIENT)
Dept: CARDIOLOGY | Facility: CLINIC | Age: 37
End: 2022-05-04

## 2022-05-04 NOTE — TELEPHONE ENCOUNTER
Patient notified no further recommendations and we will reschedule testing.    Scheduling,   Patient is scheduled for echo and stress test on 5/18/2022. Can we reschedule for sooner per [facility]'s request?    Thanks,   Cecile Last RN  [facility] T[facility]

## 2022-05-04 NOTE — TELEPHONE ENCOUNTER
Patient called triage complaining of intermittent shortness of breath and coughing when standing up. This started occurring several months ago but states she feels like it is worsening. Her symptoms normally subside with rest but are becoming more persistent. She forgot to mention these symptoms at her last office visit on 4/22/2022.     Patient states shortness of breath leads to non-productive cough, denies feeling sick. She reports on Monday she felt like she was going to pass out from coughing. Last night the patient reports feeling like a toddler was sitting on her chest and couldn't take a deep breath. This morning she took a break while climbing stairs due to shortness of breath. She has felt short of breath all morning. Her current BP is 128/83 HR 86, no pulse ox available.    Patient is unsure if her symptoms are anxiety related and admits to recent stress related to her step dad recently having an MI and adds that she has been searching heart failure symptoms on the internet.     Patient is at work in an office setting. I encouraged her to rest as much as possible. ER for worsening symptoms that don't resolve. Patient verbalized understanding. She requested to reschedule her echo and stress test to a sooner date. Would this be ok with you, and do you have any additional recommendations for her?    Thanks,   Cecile Last RN  [facility] Triage

## 2022-05-06 ENCOUNTER — CLINICAL SUPPORT (OUTPATIENT)
Dept: FAMILY MEDICINE CLINIC | Facility: CLINIC | Age: 37
End: 2022-05-06

## 2022-05-06 DIAGNOSIS — Z00.00 ANNUAL PHYSICAL EXAM: ICD-10-CM

## 2022-05-06 PROCEDURE — 36415 COLL VENOUS BLD VENIPUNCTURE: CPT | Performed by: NURSE PRACTITIONER

## 2022-05-06 PROCEDURE — 85027 COMPLETE CBC AUTOMATED: CPT | Performed by: NURSE PRACTITIONER

## 2022-05-06 PROCEDURE — 86803 HEPATITIS C AB TEST: CPT | Performed by: NURSE PRACTITIONER

## 2022-05-06 PROCEDURE — 80061 LIPID PANEL: CPT | Performed by: NURSE PRACTITIONER

## 2022-05-06 PROCEDURE — 80053 COMPREHEN METABOLIC PANEL: CPT | Performed by: NURSE PRACTITIONER

## 2022-05-06 NOTE — PROGRESS NOTES
Venipuncture Blood Specimen Collection  Venipuncture performed in left arm by Shira Kimball with good hemostasis. Patient tolerated the procedure well without complications.   05/06/22   Shira Kimball

## 2022-05-07 LAB
ALBUMIN SERPL-MCNC: 4.1 G/DL (ref 3.5–5.2)
ALBUMIN/GLOB SERPL: 1.2 G/DL
ALP SERPL-CCNC: 60 U/L (ref 39–117)
ALT SERPL W P-5'-P-CCNC: 15 U/L (ref 1–33)
ANION GAP SERPL CALCULATED.3IONS-SCNC: 13.3 MMOL/L (ref 5–15)
AST SERPL-CCNC: 16 U/L (ref 1–32)
BILIRUB SERPL-MCNC: 0.3 MG/DL (ref 0–1.2)
BUN SERPL-MCNC: 8 MG/DL (ref 6–20)
BUN/CREAT SERPL: 11.9 (ref 7–25)
CALCIUM SPEC-SCNC: 9 MG/DL (ref 8.6–10.5)
CHLORIDE SERPL-SCNC: 108 MMOL/L (ref 98–107)
CHOLEST SERPL-MCNC: 140 MG/DL (ref 0–200)
CO2 SERPL-SCNC: 21.7 MMOL/L (ref 22–29)
CREAT SERPL-MCNC: 0.67 MG/DL (ref 0.57–1)
DEPRECATED RDW RBC AUTO: 43.6 FL (ref 37–54)
EGFRCR SERPLBLD CKD-EPI 2021: 116.3 ML/MIN/1.73
ERYTHROCYTE [DISTWIDTH] IN BLOOD BY AUTOMATED COUNT: 12.9 % (ref 12.3–15.4)
GLOBULIN UR ELPH-MCNC: 3.3 GM/DL
GLUCOSE SERPL-MCNC: 87 MG/DL (ref 65–99)
HCT VFR BLD AUTO: 44.1 % (ref 34–46.6)
HCV AB SER DONR QL: NORMAL
HDLC SERPL-MCNC: 47 MG/DL (ref 40–60)
HGB BLD-MCNC: 14.2 G/DL (ref 12–15.9)
LDLC SERPL CALC-MCNC: 76 MG/DL (ref 0–100)
LDLC/HDLC SERPL: 1.59 {RATIO}
MCH RBC QN AUTO: 29.5 PG (ref 26.6–33)
MCHC RBC AUTO-ENTMCNC: 32.2 G/DL (ref 31.5–35.7)
MCV RBC AUTO: 91.7 FL (ref 79–97)
PLATELET # BLD AUTO: 340 10*3/MM3 (ref 140–450)
PMV BLD AUTO: 11 FL (ref 6–12)
POTASSIUM SERPL-SCNC: 4.7 MMOL/L (ref 3.5–5.2)
PROT SERPL-MCNC: 7.4 G/DL (ref 6–8.5)
RBC # BLD AUTO: 4.81 10*6/MM3 (ref 3.77–5.28)
SODIUM SERPL-SCNC: 143 MMOL/L (ref 136–145)
TRIGL SERPL-MCNC: 91 MG/DL (ref 0–150)
VLDLC SERPL-MCNC: 17 MG/DL (ref 5–40)
WBC NRBC COR # BLD: 6.03 10*3/MM3 (ref 3.4–10.8)

## 2022-05-12 ENCOUNTER — HOSPITAL ENCOUNTER (OUTPATIENT)
Dept: CARDIOLOGY | Facility: HOSPITAL | Age: 37
Discharge: HOME | End: 2022-05-12

## 2022-05-12 VITALS
DIASTOLIC BLOOD PRESSURE: 86 MMHG | SYSTOLIC BLOOD PRESSURE: 122 MMHG | HEIGHT: 70 IN | BODY MASS INDEX: 36.65 KG/M2 | HEART RATE: 65 BPM | WEIGHT: 256 LBS

## 2022-05-12 DIAGNOSIS — I42.8 NON-ISCHEMIC CARDIOMYOPATHY: ICD-10-CM

## 2022-05-12 DIAGNOSIS — R06.09 EXERTIONAL DYSPNEA: ICD-10-CM

## 2022-05-12 LAB
AORTIC ARCH: 2.3 CM
BH CV ECHO MEAS - ACS: 2 CM
BH CV ECHO MEAS - AO MAX PG: 4.7 MMHG
BH CV ECHO MEAS - AO MEAN PG: 2.44 MMHG
BH CV ECHO MEAS - AO ROOT DIAM: 3.1 CM
BH CV ECHO MEAS - AO V2 MAX: 108.6 CM/SEC
BH CV ECHO MEAS - AO V2 VTI: 20.2 CM
BH CV ECHO MEAS - AVA(I,D): 2.8 CM2
BH CV ECHO MEAS - EDV(CUBED): 54.3 ML
BH CV ECHO MEAS - EDV(MOD-SP2): 157 ML
BH CV ECHO MEAS - EDV(MOD-SP4): 170 ML
BH CV ECHO MEAS - EF(MOD-BP): 59.2 %
BH CV ECHO MEAS - EF(MOD-SP2): 54.1 %
BH CV ECHO MEAS - EF(MOD-SP4): 65.3 %
BH CV ECHO MEAS - ESV(MOD-SP2): 72 ML
BH CV ECHO MEAS - ESV(MOD-SP4): 59 ML
BH CV ECHO MEAS - IVS/LVPW: 1.05 CM
BH CV ECHO MEAS - IVSD: 0.92 CM
BH CV ECHO MEAS - LAT PEAK E' VEL: 14.4 CM/SEC
BH CV ECHO MEAS - LV DIASTOLIC VOL/BSA (35-75): 75.1 CM2
BH CV ECHO MEAS - LV MASS(C)D: 100.3 GRAMS
BH CV ECHO MEAS - LV MAX PG: 5.1 MMHG
BH CV ECHO MEAS - LV MEAN PG: 2.23 MMHG
BH CV ECHO MEAS - LV SYSTOLIC VOL/BSA (12-30): 26.1 CM2
BH CV ECHO MEAS - LV V1 MAX: 113.1 CM/SEC
BH CV ECHO MEAS - LV V1 VTI: 18.5 CM
BH CV ECHO MEAS - LVIDD: 3.8 CM
BH CV ECHO MEAS - LVOT AREA: 3 CM2
BH CV ECHO MEAS - LVOT DIAM: 1.96 CM
BH CV ECHO MEAS - LVPWD: 0.88 CM
BH CV ECHO MEAS - MED PEAK E' VEL: 11.2 CM/SEC
BH CV ECHO MEAS - MV A MAX VEL: 53.1 CM/SEC
BH CV ECHO MEAS - MV DEC TIME: 0.21 MSEC
BH CV ECHO MEAS - MV E MAX VEL: 80.6 CM/SEC
BH CV ECHO MEAS - MV E/A: 1.52
BH CV ECHO MEAS - PA ACC TIME: 0.11 SEC
BH CV ECHO MEAS - PA PR(ACCEL): 29.1 MMHG
BH CV ECHO MEAS - PA V2 MAX: 95.3 CM/SEC
BH CV ECHO MEAS - PULM A REVS DUR: 0.17 SEC
BH CV ECHO MEAS - PULM A REVS VEL: 27.2 CM/SEC
BH CV ECHO MEAS - PULM DIAS VEL: 44.9 CM/SEC
BH CV ECHO MEAS - PULM S/D: 0.79
BH CV ECHO MEAS - PULM SYS VEL: 35.3 CM/SEC
BH CV ECHO MEAS - RAP SYSTOLE: 3 MMHG
BH CV ECHO MEAS - RV MAX PG: 1.18 MMHG
BH CV ECHO MEAS - RV V1 MAX: 54.4 CM/SEC
BH CV ECHO MEAS - RV V1 VTI: 12.3 CM
BH CV ECHO MEAS - RVSP: 14.9 MMHG
BH CV ECHO MEAS - SI(MOD-SP2): 37.6 ML/M2
BH CV ECHO MEAS - SI(MOD-SP4): 49 ML/M2
BH CV ECHO MEAS - SV(LVOT): 55.7 ML
BH CV ECHO MEAS - SV(MOD-SP2): 85 ML
BH CV ECHO MEAS - SV(MOD-SP4): 111 ML
BH CV ECHO MEAS - TAPSE (>1.6): 1.46 CM
BH CV ECHO MEAS - TR MAX PG: 11.9 MMHG
BH CV ECHO MEAS - TR MAX VEL: 172.5 CM/SEC
BH CV ECHO MEASUREMENTS AVERAGE E/E' RATIO: 6.3
BH CV STRESS BP STAGE 1: NORMAL
BH CV STRESS BP STAGE 2: NORMAL
BH CV STRESS DURATION MIN STAGE 1: 3
BH CV STRESS DURATION MIN STAGE 2: 3
BH CV STRESS DURATION MIN STAGE 3: 0
BH CV STRESS DURATION SEC STAGE 1: 0
BH CV STRESS DURATION SEC STAGE 2: 0
BH CV STRESS DURATION SEC STAGE 3: 30
BH CV STRESS GRADE STAGE 1: 10
BH CV STRESS GRADE STAGE 2: 12
BH CV STRESS GRADE STAGE 3: 14
BH CV STRESS HR STAGE 1: 140
BH CV STRESS HR STAGE 2: 162
BH CV STRESS HR STAGE 3: 169
BH CV STRESS METS STAGE 1: 5
BH CV STRESS METS STAGE 2: 7.5
BH CV STRESS METS STAGE 3: 8
BH CV STRESS O2 STAGE 1: 99
BH CV STRESS PROTOCOL 1: NORMAL
BH CV STRESS RECOVERY BP: NORMAL MMHG
BH CV STRESS RECOVERY HR: 106 BPM
BH CV STRESS SPEED STAGE 1: 1.7
BH CV STRESS SPEED STAGE 2: 2.5
BH CV STRESS SPEED STAGE 3: 3.4
BH CV STRESS STAGE 1: 1
BH CV STRESS STAGE 2: 2
BH CV STRESS STAGE 3: 3
BH CV XLRA - RV BASE: 3.1 CM
BH CV XLRA - RV LENGTH: 6.2 CM
BH CV XLRA - RV MID: 3 CM
BH CV XLRA - TDI S': 15.9 CM/SEC
LEFT ATRIUM VOLUME INDEX: 15.5 ML/M2
LV EF 2D ECHO EST: 55 %
MAXIMAL PREDICTED HEART RATE: 184 BPM
MAXIMAL PREDICTED HEART RATE: 184 BPM
PERCENT MAX PREDICTED HR: 91.85 %
SINUS: 2.6 CM
STJ: 2.43 CM
STRESS BASELINE BP: NORMAL MMHG
STRESS BASELINE HR: 98 BPM
STRESS PERCENT HR: 108 %
STRESS POST ESTIMATED WORKLOAD: 8 METS
STRESS POST EXERCISE DUR MIN: 6 MIN
STRESS POST EXERCISE DUR SEC: 30 SEC
STRESS POST PEAK BP: NORMAL MMHG
STRESS POST PEAK HR: 169 BPM
STRESS TARGET HR: 156 BPM
STRESS TARGET HR: 156 BPM

## 2022-05-12 PROCEDURE — 93306 TTE W/DOPPLER COMPLETE: CPT

## 2022-05-12 PROCEDURE — 93018 CV STRESS TEST I&R ONLY: CPT | Performed by: INTERNAL MEDICINE

## 2022-05-12 PROCEDURE — 25010000002 PERFLUTREN (DEFINITY) 8.476 MG IN SODIUM CHLORIDE (PF) 0.9 % 10 ML INJECTION: Performed by: INTERNAL MEDICINE

## 2022-05-12 PROCEDURE — 93016 CV STRESS TEST SUPVJ ONLY: CPT | Performed by: INTERNAL MEDICINE

## 2022-05-12 PROCEDURE — 93306 TTE W/DOPPLER COMPLETE: CPT | Performed by: INTERNAL MEDICINE

## 2022-05-12 PROCEDURE — 93017 CV STRESS TEST TRACING ONLY: CPT

## 2022-05-12 RX ADMIN — PERFLUTREN 2 ML: 6.52 INJECTION, SUSPENSION INTRAVENOUS at 08:52

## 2022-05-12 NOTE — PROGRESS NOTES
Please let patient know this is a normal echo and stress study.  She can get back to normal exercise and activity as tolerated.  I will see her at next scheduled follow-up visit.

## 2022-05-13 NOTE — PROGRESS NOTES
Reviewed results and recommendations with Annie Oshea.  Patient verbalized understanding of results and recommendations.    Patient asked if anything further needed to be done about her BP but did not have any values to share with me.  Requested patient provide BP/HR log to office for Dr. Black to review.  Patient verbalized understanding.    Thank you,  Raquel Lewis RN  Triage Nurse [facility] [place]

## 2022-08-23 ENCOUNTER — OFFICE VISIT (OUTPATIENT)
Dept: CARDIOLOGY | Facility: CLINIC | Age: 37
End: 2022-08-23

## 2022-08-23 VITALS
BODY MASS INDEX: 35.07 KG/M2 | HEART RATE: 92 BPM | SYSTOLIC BLOOD PRESSURE: 120 MMHG | DIASTOLIC BLOOD PRESSURE: 70 MMHG | WEIGHT: 245 LBS | HEIGHT: 70 IN

## 2022-08-23 DIAGNOSIS — R06.09 EXERTIONAL DYSPNEA: ICD-10-CM

## 2022-08-23 DIAGNOSIS — R00.2 PALPITATIONS: Primary | ICD-10-CM

## 2022-08-23 PROCEDURE — 99214 OFFICE O/P EST MOD 30 MIN: CPT | Performed by: NURSE PRACTITIONER

## 2022-08-23 NOTE — PROGRESS NOTES
"Date of Office Visit: 22  Encounter Provider: JOSE A Aggarwal  Place of Service: [facility]  Patient Name: Annie Oshea  :1985    Chief Complaint   Patient presents with   • Palpitations   • Shortness of Breath   • Edema     Occas.   • Hypertension   • Follow-up   :     HPI: Annie Oshea is a 36 y.o. female  with Hashimoto's thyroiditis, palpitations, atypical chest pain, obesity, nocturnal periodic limb movement, preeclampsia and gestational diabetes.    She is followed by Dr. Black.  I will visit with her for the first time today have reviewed her medical record.    She had prior sleep study which did not show sleep apnea.  She was given Ambien for the study but was found to have paroxysmal limb movement.  This was approximately 2019.    She is evaluated for palpitations which she reported for 10 years duration intermittently.  She had shortness of breath and weight gain.  Echocardiogram May 2022 showed normal left ventricular systolic function, normal diastolic function, no significant valvular disease and normal RVSP with normal right ventricular size and function.  She also had treadmill ECG and this was a normal ECG stress test despite complaint of chest discomfort and shortness of breath.  She presents for reassessment.  She is lost 9 pounds since April by adjusting her diet.  She is not currently exercising.  She is a single mother of 3 children whose ages are 6, 12 and 15.  She has a skipped beat sensation in her heart that \"sucks the wind\" out of her.  She states she has had this for 10 years but over the last 6 months to a year its been more frequent.  She has no near-syncope or syncope associated.  She \"rarely has shortness of breath\".  She has some occasional mild feet swelling but that resolves on its own.        No Known Allergies        Family and social history reviewed.     ROS  All other systems were reviewed and are negative        " "  Objective:     Vitals:    08/23/22 1412   BP: 120/70   BP Location: Left arm   Patient Position: Sitting   Pulse: 92   Weight: 111 kg (245 lb)   Height: 177.8 cm (70\")     Body mass index is 35.15 kg/m².    PHYSICAL EXAM:  Pulmonary:      Effort: Pulmonary effort is normal.      Breath sounds: Normal breath sounds.   Cardiovascular:      Normal rate. Regular rhythm.         Procedures      Current Outpatient Medications   Medication Sig Dispense Refill   • etonogestrel-ethinyl estradiol (NUVARING) 0.12-0.015 MG/24HR vaginal ring      • levothyroxine (Synthroid) 50 MCG tablet Take 1 tablet by mouth Every Morning. 30 tablet 5     No current facility-administered medications for this visit.     Assessment:       Diagnosis Plan   1. Palpitations     2. Exertional dyspnea          No orders of the defined types were placed in this encounter.        Plan:       1. 36-year-old female with palpitations.  These are more frequent over the last 6 months to a year.  She had an unremarkable echo and treadmill ECG in May 2022.  We will see if her insurance will cover a 2-week mobile telemetry for further evaluation.  I suspect she is having some infrequent ventricular ectopy  2.  History of fluctuating elevated blood pressure.  I have advised that she start checking her blood pressure more closely at home that she is lost 9 pounds since April which seem to be helping  3.  History of gestational diabetes 2016  4.  No sleep apnea but PLM on sleep study after receiving Ambien 2019  5.  Hashimoto thyroiditis.  Last TSH was normal February 2022.    Addendum-insurance will not cover the extended monitor so have ordered a 48-hour Holter for evaluation    Further recommendation will be made after the results of the Zio patch        It has been a pleasure to participate in this patient's care.      Thank you,  JOSE A Aggarwal      **I used Dragon to dictate this note:**  "

## 2022-11-28 ENCOUNTER — TELEPHONE (OUTPATIENT)
Dept: CARDIOLOGY | Facility: CLINIC | Age: 37
End: 2022-11-28

## 2022-11-28 DIAGNOSIS — I49.3 PVC (PREMATURE VENTRICULAR CONTRACTION): ICD-10-CM

## 2022-11-28 DIAGNOSIS — R00.2 PALPITATIONS: Primary | ICD-10-CM

## 2022-11-28 NOTE — TELEPHONE ENCOUNTER
Please inform patient her monitor has resulted and as I suspected, we have found infrequent PVCs.  Please explain that this is an extra contraction from the lower chamber of her heart but since these are rare in summary based on the report, I would not recommend medication to suppress these at this time.  Please explain that routine exercise, adequate hydration, adequate sleep such as 6 to 8 hours, minimizing stress and avoiding caffeine and other stimulants can help to suppress these.  I recommend that she follow-up with Dr. Black in august next year which would be annual unless symptoms worsen.

## 2022-11-29 NOTE — TELEPHONE ENCOUNTER
I placed order for 14 day zio. I think it will just show the same thing but happy to further evaluate for her

## 2022-11-29 NOTE — TELEPHONE ENCOUNTER
Notified patient of recommendations. Patient verbalized understanding.    Vicky Chambers RN  Triage [facility] [place]

## 2022-11-29 NOTE — TELEPHONE ENCOUNTER
"Notified patient of results/recommendations. Patient verbalized understanding. She said that these episodes come in waves. She said when she wore the monitor those were \"light\", but the following week she had episodes where they were frequent and stronger to the point where they took her breath away. She said you had mention something about a 2 week monitor if this monitor did not show anything. She is wondering if that is still an option?    Vicky Chambers RN  Triage [facility] [place]    "

## 2023-01-13 ENCOUNTER — TELEPHONE (OUTPATIENT)
Dept: CARDIOLOGY | Facility: CLINIC | Age: 38
End: 2023-01-13
Payer: COMMERCIAL

## 2023-01-13 NOTE — TELEPHONE ENCOUNTER
I do not have the entire report available to me at this time, only the summary from the doctor. Her Average HR:  93. Min HR:  55. Max HR:  176. The summary does not reflect any significant change reflective of those times

## 2023-01-13 NOTE — TELEPHONE ENCOUNTER
I will set a reminder to look back in her chart for the scanned report next week to try and give her more info regarding her concern.

## 2023-01-13 NOTE — TELEPHONE ENCOUNTER
Please inform patient monitor showed no significant change in heart rhythm. She has PACs, PVCs and nonsustained tachycardia that we saw up to only 6 beats. Would not make any changes and she is stable for 6 month or even one year follow up with Dr. Black.

## 2023-01-13 NOTE — TELEPHONE ENCOUNTER
I spoke with patient and informed her that her reported episodes of palpitations and symptoms in the diary did not correlate to any abnormalities at that time on the monitor.    Pt still continues to express concern over these specific dates and would like for us to review these specific dates/times once the full report gets uploaded into the computer.    Thank you,    Telma Bailey RN  [place] [facility] [place]  01/13/23 09:45 EST

## 2023-01-17 NOTE — TELEPHONE ENCOUNTER
Please inform patient I went back to review her scanned ZIO tracings and on 12/25 she had nonsustained PVC runs and on 1/1 in the morning she had sinus tachycardia heart rate 122-130. I can send her in a 1/2 tablet metoprolol to take as needed for palpitations or tachycardia.

## 2023-01-17 NOTE — TELEPHONE ENCOUNTER
I spoke with Annie Oshea and updated pt on results/recommendations from provider.  Pt verbalized understanding and has no further questions at this time.    She is agreeable to a PRN metoprolol for palpitations/tachycardia.  I confirmed the [email or web address] pharmacy is her preferred on file with us.    Thank you,    Telma Bailey, RN  Triage [facility] [place]  01/17/23 09:16 EST

## 2023-01-24 ENCOUNTER — OFFICE VISIT (OUTPATIENT)
Dept: FAMILY MEDICINE CLINIC | Facility: CLINIC | Age: 38
End: 2023-01-24
Payer: COMMERCIAL

## 2023-01-24 VITALS
SYSTOLIC BLOOD PRESSURE: 112 MMHG | HEIGHT: 70 IN | BODY MASS INDEX: 33.36 KG/M2 | HEART RATE: 100 BPM | TEMPERATURE: 97.8 F | OXYGEN SATURATION: 98 % | DIASTOLIC BLOOD PRESSURE: 78 MMHG | WEIGHT: 233 LBS

## 2023-01-24 DIAGNOSIS — Z83.3 FAMILY HISTORY OF DIABETES MELLITUS: ICD-10-CM

## 2023-01-24 DIAGNOSIS — E03.8 HYPOTHYROIDISM DUE TO HASHIMOTO'S THYROIDITIS: ICD-10-CM

## 2023-01-24 DIAGNOSIS — Z86.32 HISTORY OF GESTATIONAL DIABETES: ICD-10-CM

## 2023-01-24 DIAGNOSIS — R00.2 PALPITATIONS: Primary | ICD-10-CM

## 2023-01-24 DIAGNOSIS — E06.3 HASHIMOTO'S DISEASE: ICD-10-CM

## 2023-01-24 DIAGNOSIS — I99.8 FLUCTUATING BLOOD PRESSURE: ICD-10-CM

## 2023-01-24 DIAGNOSIS — E04.9 THYROID ENLARGEMENT: ICD-10-CM

## 2023-01-24 DIAGNOSIS — E06.3 HYPOTHYROIDISM DUE TO HASHIMOTO'S THYROIDITIS: ICD-10-CM

## 2023-01-24 DIAGNOSIS — F41.8 SITUATIONAL ANXIETY: ICD-10-CM

## 2023-01-24 DIAGNOSIS — Z82.49 FAMILY HISTORY OF EARLY CAD: ICD-10-CM

## 2023-01-24 DIAGNOSIS — R63.2 POLYPHAGIA: ICD-10-CM

## 2023-01-24 PROCEDURE — 83036 HEMOGLOBIN GLYCOSYLATED A1C: CPT | Performed by: NURSE PRACTITIONER

## 2023-01-24 PROCEDURE — 84439 ASSAY OF FREE THYROXINE: CPT | Performed by: NURSE PRACTITIONER

## 2023-01-24 PROCEDURE — 83735 ASSAY OF MAGNESIUM: CPT | Performed by: NURSE PRACTITIONER

## 2023-01-24 PROCEDURE — 80053 COMPREHEN METABOLIC PANEL: CPT | Performed by: NURSE PRACTITIONER

## 2023-01-24 PROCEDURE — 84443 ASSAY THYROID STIM HORMONE: CPT | Performed by: NURSE PRACTITIONER

## 2023-01-24 PROCEDURE — 86376 MICROSOMAL ANTIBODY EACH: CPT | Performed by: NURSE PRACTITIONER

## 2023-01-24 PROCEDURE — 85027 COMPLETE CBC AUTOMATED: CPT | Performed by: NURSE PRACTITIONER

## 2023-01-24 PROCEDURE — 86800 THYROGLOBULIN ANTIBODY: CPT | Performed by: NURSE PRACTITIONER

## 2023-01-24 PROCEDURE — 99214 OFFICE O/P EST MOD 30 MIN: CPT | Performed by: NURSE PRACTITIONER

## 2023-01-24 PROCEDURE — 36415 COLL VENOUS BLD VENIPUNCTURE: CPT | Performed by: NURSE PRACTITIONER

## 2023-01-24 RX ORDER — LEVOTHYROXINE SODIUM 0.05 MG/1
50 TABLET ORAL
Qty: 30 TABLET | Refills: 5 | Status: SHIPPED | OUTPATIENT
Start: 2023-01-24

## 2023-01-24 RX ORDER — HYDROXYZINE HYDROCHLORIDE 10 MG/1
10 TABLET, FILM COATED ORAL 3 TIMES DAILY PRN
Qty: 30 TABLET | Refills: 1 | Status: SHIPPED | OUTPATIENT
Start: 2023-01-24

## 2023-01-24 NOTE — PROGRESS NOTES
..  Venipuncture Blood Specimen Collection  Venipuncture performed in left arm by Manda Chavez with good hemostasis. Patient tolerated the procedure well without complications.   01/24/23   Manda Chavez

## 2023-01-24 NOTE — PROGRESS NOTES
Chief Complaint  Referral - Heart Txp (Patient wants a referral for Cardiologist for a 2nd opinion)    Subjective            Annie Oshea presents to [facility]  History of Present Illness  Patient reports she is here today for a referral for cardiology for second opinion with regards to her palpitations    And pt reports having had a workup thus far--included EKG and stress test and then the 48 hr Holter monitor and then the 72 hr Holter monitor --and then pt reports would like a second opinion of this--patient reports they did give her metoprolol tartate  25 mg to take one half tab twice daily as needed and patient had discussed with one of her friends that is a nurse and she had never heard of it being as needed and that concerned the patient so she did not start the medication nor pick it up    She also reports she had also a significant family history of early onset coronary artery disease as well    Then also reports in the past was not taking thyroid med regular but for the past one month taking the med regularly    Also patient was concerned possibly trying to develop diabetes as she does have polyphasia symptoms and she did have prior gestational diabetes and there is diabetes in her family     WILMER-7 score=12   PHQ-2 Total Score: 1  PHQ-9 Total Score: 1    Past Medical History:   Diagnosis Date   • Anxiety    • Cardiomyopathy (HCC)     PER DR JIMENEZ OFFICE NOTE 4/22/22-DD   • Chiari I malformation (HCC)    • Depression    • Gestational diabetes    • Hashimoto's disease    • Hypothyroid    • Kidney stones    • Palpitations     PER DR JIMENEZ OFFICE NOTE 4/22/22-DD   • Pre-eclampsia    • PVC (premature ventricular contraction)    • Sinus tachycardia        No Known Allergies     Past Surgical History:   Procedure Laterality Date   • KIDNEY STONE SURGERY     • MOLE REMOVAL     • WISDOM TOOTH EXTRACTION          Social History     Tobacco Use   • Smoking status: Never   •  "Smokeless tobacco: Never   • Tobacco comments:     no caffeine   Vaping Use   • Vaping Use: Never used   Substance Use Topics   • Alcohol use: Yes     Comment: \"social\"   • Drug use: Never       Family History   Problem Relation Age of Onset   • Depression Mother    • Hypertension Mother    • Arthritis Mother    • Hypertension Father    • Arthritis Sister    • Asthma Brother    • Depression Maternal Grandmother    • Diabetes type II Maternal Grandmother    • Hypertension Maternal Grandmother    • Arthritis Maternal Grandmother    • Diabetes type II Maternal Grandfather    • Hypertension Paternal Grandmother    • Alcohol abuse Paternal Grandfather    • Heart disease Other         Health Maintenance Due   Topic Date Due   • Hepatitis B (1 of 3 - 3-dose series) Never done   • COVID-19 Vaccine (1) 02/28/1986   • PAP SMEAR  Never done   • INFLUENZA VACCINE  08/01/2022        Current Outpatient Medications on File Prior to Visit   Medication Sig   • etonogestrel-ethinyl estradiol (NUVARING) 0.12-0.015 MG/24HR vaginal ring    • [DISCONTINUED] levothyroxine (Synthroid) 50 MCG tablet Take 1 tablet by mouth Every Morning.   • [DISCONTINUED] metoprolol tartrate (LOPRESSOR) 25 MG tablet Take 0.5 tablets by mouth 2 (Two) Times a Day As Needed (for palpitations or tachycardia).     No current facility-administered medications on file prior to visit.       Immunization History   Administered Date(s) Administered   • COVID-19 (MODERNA) BOOSTER 03/10/2021, 04/07/2021   • Flu Vaccine Quad PF >36MO 10/27/2021   • Fluzone Quad >6mos (Multi-dose) 09/15/2020   • Influenza, Unspecified 11/07/2019, 09/24/2020   • Tdap 04/06/2022       Review of Systems   Constitutional: Positive for fatigue.   HENT: Negative for trouble swallowing.    Respiratory: Positive for shortness of breath. Negative for choking.         During the palpitations    Cardiovascular: Positive for palpitations.        Approx intermittently past few days and then sometimes " "will go a few days without them-   Gastrointestinal: Negative for abdominal pain and blood in stool.   Endocrine: Positive for polyphagia. Negative for polydipsia and polyuria.   Genitourinary: Negative for menstrual problem.   Neurological: Negative for dizziness, syncope and light-headedness.   Psychiatric/Behavioral: Negative for self-injury, suicidal ideas and depressed mood. The patient is nervous/anxious.         Stress related         Objective     /78 (BP Location: Left arm)   Pulse 100 Comment: apical  Temp 97.8 °F (36.6 °C)   Ht 177.8 cm (70\")   Wt 106 kg (233 lb)   SpO2 98%   BMI 33.43 kg/m²       Physical Exam  Vitals and nursing note reviewed.   Constitutional:       Appearance: Normal appearance.   HENT:      Head: Normocephalic.      Right Ear: External ear normal.      Left Ear: External ear normal.      Nose: Nose normal.      Mouth/Throat:      Comments: Wearing mask  Eyes:      Pupils: Pupils are equal, round, and reactive to light.   Neck:      Thyroid: Thyromegaly present. No thyroid mass or thyroid tenderness.     Cardiovascular:      Rate and Rhythm: Normal rate and regular rhythm.      Heart sounds: Normal heart sounds.   Pulmonary:      Effort: Pulmonary effort is normal.      Breath sounds: Normal breath sounds.   Musculoskeletal:         General: Normal range of motion.      Cervical back: Normal range of motion and neck supple.   Skin:     General: Skin is warm and dry.   Neurological:      Mental Status: She is alert and oriented to person, place, and time.   Psychiatric:         Mood and Affect: Mood normal.         Behavior: Behavior normal.         Thought Content: Thought content normal.         Judgment: Judgment normal.         Result Review :           Office Visit with Chen Bazan APRN (08/23/2022)  Telephone with Chen Bazan APRN (11/28/2022)  Telephone with Chen Bazan APRN (01/13/2023)  SCANNED - CARDIOLOGY (12/21/2022)  Holter Monitor - 48 Hour " (11/08/2022 15:05)  Treadmill Stress Test (05/12/2022 09:26)  Adult Transthoracic Echo Complete w/ Color, Spectral and Contrast if Necessary Per Protocol (05/12/2022 08:53)    Echocardiogram Findings    Left Ventricle Calculated left ventricular EF = 59.2% Estimated left ventricular EF = 55% Left ventricular systolic function is normal.   Normal left ventricular cavity size and wall thickness noted. All left ventricular wall segments contract normally. Left ventricular diastolic function was normal.   Right Ventricle Normal right ventricular cavity size and systolic function noted.   Left Atrium Normal left atrial cavity size noted.   Right Atrium Normal right atrial cavity size noted.   Aortic Valve The aortic valve is grossly normal in structure. Trace aortic valve regurgitation is present. No aortic valve stenosis is present.   Mitral Valve The mitral valve is grossly normal in structure. No significant mitral valve regurgitation is present. No significant mitral valve stenosis is present.   Tricuspid Valve Trace tricuspid valve regurgitation is present. Estimated right ventricular systolic pressure from tricuspid regurgitation is normal (<35 mmHg). No evidence of significant tricuspid valve stenosis is present.   Pulmonic Valve The pulmonic valve is not well visualized. There is no significant pulmonic valve regurgitation present. There is no pulmonic valve stenosis present.   Greater Vessels No dilation of the aortic root is present.   Pericardium There is no evidence of pericardial effusion. .       XR Chest 2 View (02/04/2020 18:39)                 Assessment and Plan      Diagnoses and all orders for this visit:    1. Palpitations (Primary)  -     TSH+Free T4  -     Magnesium  -     Comprehensive Metabolic Panel  -     CBC (No Diff)  -     Ambulatory Referral to Cardiology  -     Hemoglobin A1c  -     US Thyroid; Future  -     Thyroid Antibodies    2. Hashimoto's disease  -     TSH+Free T4  -      Magnesium  -     Comprehensive Metabolic Panel  -     CBC (No Diff)  -     US Thyroid; Future  -     Thyroid Antibodies    3. Hypothyroidism due to Hashimoto's thyroiditis  -     TSH+Free T4  -     Magnesium  -     Comprehensive Metabolic Panel  -     CBC (No Diff)  -     levothyroxine (Synthroid) 50 MCG tablet; Take 1 tablet by mouth Every Morning.  Dispense: 30 tablet; Refill: 5  -     US Thyroid; Future  -     Thyroid Antibodies    4. Fluctuating blood pressure  -     TSH+Free T4  -     Magnesium  -     Comprehensive Metabolic Panel  -     CBC (No Diff)  -     Ambulatory Referral to Cardiology    5. Polyphagia  -     Hemoglobin A1c    6. Family history of diabetes mellitus  -     Hemoglobin A1c    7. Family history of early CAD  -     Ambulatory Referral to Cardiology    8. History of gestational diabetes  -     Hemoglobin A1c    9. Situational anxiety  -     hydrOXYzine (ATARAX) 10 MG tablet; Take 1 tablet by mouth 3 (Three) Times a Day As Needed for Anxiety.  Dispense: 30 tablet; Refill: 1  -     US Thyroid; Future  -     Thyroid Antibodies    10. Thyroid enlargement  Comments:  more so left sided   Orders:  -     US Thyroid; Future  -     Thyroid Antibodies    Patient did not want to try anything for anxiety on a every day basis but was willing to trial something light on a as needed basis so we did send in the hydroxyzine 10 mg as needed    Also with regards to the metoprolol tartate half tab twice daily that had been written-I did explain to her that I had seen another beta-blockers used that way as needed for stage fright, anxiety, tremors, and palpitations and migraine prophylaxis (propanolol) that was much the same principal since it was the shorter acting metoprolol-but patient still wants another evaluation by a different cardiologist    And then also there was slight enlargement on the left side of the thyroid patient with Hashimoto's thyroiditis in the past and reports since she has been taking her  thyroid medicine regularly for approximately a month that the enlargement of the thyroid has gotten smaller but we will still proceed with labs as well at thyroid ultrasound    And did go ahead and proceed with the referral to cardiology for the second opinion as requested by patient--although I did review the tests and they did appear to be stable-and explained to the patient that actually the thyroid could be the culprit for the palpitations intermittently        Follow Up     Return if symptoms worsen or fail to improve.  And patient can follow-up as needed in accordance to the lab results thyroid ultrasound and with regards to her symptoms and the as needed use of the Atarax and I did go ahead and proceed with refilling her thyroid medication

## 2023-01-25 LAB
ALBUMIN SERPL-MCNC: 4.3 G/DL (ref 3.5–5.2)
ALBUMIN/GLOB SERPL: 1.4 G/DL
ALP SERPL-CCNC: 62 U/L (ref 39–117)
ALT SERPL W P-5'-P-CCNC: 12 U/L (ref 1–33)
ANION GAP SERPL CALCULATED.3IONS-SCNC: 7.9 MMOL/L (ref 5–15)
AST SERPL-CCNC: 10 U/L (ref 1–32)
BILIRUB SERPL-MCNC: 0.3 MG/DL (ref 0–1.2)
BUN SERPL-MCNC: 8 MG/DL (ref 6–20)
BUN/CREAT SERPL: 11.3 (ref 7–25)
CALCIUM SPEC-SCNC: 9.6 MG/DL (ref 8.6–10.5)
CHLORIDE SERPL-SCNC: 106 MMOL/L (ref 98–107)
CO2 SERPL-SCNC: 24.1 MMOL/L (ref 22–29)
CREAT SERPL-MCNC: 0.71 MG/DL (ref 0.57–1)
DEPRECATED RDW RBC AUTO: 40.5 FL (ref 37–54)
EGFRCR SERPLBLD CKD-EPI 2021: 112.5 ML/MIN/1.73
ERYTHROCYTE [DISTWIDTH] IN BLOOD BY AUTOMATED COUNT: 12.7 % (ref 12.3–15.4)
GLOBULIN UR ELPH-MCNC: 3.1 GM/DL
GLUCOSE SERPL-MCNC: 77 MG/DL (ref 65–99)
HBA1C MFR BLD: 5.5 % (ref 4.8–5.6)
HCT VFR BLD AUTO: 43.5 % (ref 34–46.6)
HGB BLD-MCNC: 14.2 G/DL (ref 12–15.9)
MAGNESIUM SERPL-MCNC: 2.6 MG/DL (ref 1.6–2.6)
MCH RBC QN AUTO: 29.2 PG (ref 26.6–33)
MCHC RBC AUTO-ENTMCNC: 32.6 G/DL (ref 31.5–35.7)
MCV RBC AUTO: 89.3 FL (ref 79–97)
PLATELET # BLD AUTO: 376 10*3/MM3 (ref 140–450)
PMV BLD AUTO: 11.1 FL (ref 6–12)
POTASSIUM SERPL-SCNC: 4.8 MMOL/L (ref 3.5–5.2)
PROT SERPL-MCNC: 7.4 G/DL (ref 6–8.5)
RBC # BLD AUTO: 4.87 10*6/MM3 (ref 3.77–5.28)
SODIUM SERPL-SCNC: 138 MMOL/L (ref 136–145)
T4 FREE SERPL-MCNC: 1.47 NG/DL (ref 0.93–1.7)
TSH SERPL DL<=0.05 MIU/L-ACNC: 4.73 UIU/ML (ref 0.27–4.2)
WBC NRBC COR # BLD: 7.91 10*3/MM3 (ref 3.4–10.8)

## 2023-01-25 NOTE — PROGRESS NOTES
Please mail letter to patient stating    Annie, the blood counts were all normal range; the magnesium was normal range; the comprehensive panel shows normal glucose and normal electrolytes and normal kidney and liver function tests;     And with regards to your thyroid levels the free T4 was normal range and the TSH level was just modest borderline elevated at 4.730 and it should be 4.200 and normally I would not make any type of that adjustment at this time on your medication dose as you have just restarted taking it on a regular basis and if you would like we can repeat this level in another month to make sure that it is in good range once you have been taking it for a couple of months on a regular basis    And then the thyroid antibodies and the hemoglobin A1c are still pending

## 2023-01-26 LAB
THYROGLOB AB SERPL-ACNC: 23.9 IU/ML (ref 0–0.9)
THYROPEROXIDASE AB SERPL-ACNC: 228 IU/ML (ref 0–34)

## 2023-01-30 NOTE — PROGRESS NOTES
Please mail letter to patient stating    Annie, the thyroid antibodies were elevated consistent with your history of Hashimoto's thyroiditis disease in the past--we will see what the thyroid ultrasound shows

## 2023-02-07 ENCOUNTER — OFFICE VISIT (OUTPATIENT)
Dept: CARDIOLOGY | Facility: CLINIC | Age: 38
End: 2023-02-07
Payer: COMMERCIAL

## 2023-02-07 VITALS
BODY MASS INDEX: 34.07 KG/M2 | HEIGHT: 70 IN | HEART RATE: 83 BPM | SYSTOLIC BLOOD PRESSURE: 137 MMHG | WEIGHT: 238 LBS | DIASTOLIC BLOOD PRESSURE: 86 MMHG

## 2023-02-07 DIAGNOSIS — R00.2 PALPITATIONS: Primary | ICD-10-CM

## 2023-02-07 DIAGNOSIS — R06.02 SHORTNESS OF BREATH: ICD-10-CM

## 2023-02-07 PROCEDURE — 99213 OFFICE O/P EST LOW 20 MIN: CPT | Performed by: INTERNAL MEDICINE

## 2023-02-07 NOTE — PROGRESS NOTES
"NP REF BY ELIZABETH NARANJO PALPITATIONS FLUCTUATING BP FAM HIST OF CAD SECOND OPINION REFERRAL LIST   Subjective:        [facility]  Cardiology Consult Note    Patient Identification:  Name: Annie Oshea  Age: 37 y.o.  Sex: female  :  1985  MRN: [id number]             CC    Second opinion, palpitation        History of Present Illness:   37-year-old female here for the second opinion for palpitation work-up has been negative also complains of shortness of breath    Comorbid cardiac risk factors:     Past Medical History:  Past Medical History:   Diagnosis Date   • Anxiety    • Cardiomyopathy (HCC)     PER DR JIMENEZ OFFICE NOTE 22-DD   • Chiari I malformation (HCC)    • Depression    • Gestational diabetes    • Hashimoto's disease    • Hypothyroid    • Kidney stones    • Palpitations     PER DR JIMENEZ OFFICE NOTE 22-DD   • Pre-eclampsia    • PVC (premature ventricular contraction)    • Sinus tachycardia      Past Surgical History:  Past Surgical History:   Procedure Laterality Date   • KIDNEY STONE SURGERY     • MOLE REMOVAL     • WISDOM TOOTH EXTRACTION        Allergies:  No Known Allergies  Home Meds:  (Not in a hospital admission)    Current Meds:     Current Outpatient Medications:   •  etonogestrel-ethinyl estradiol (NUVARING) 0.12-0.015 MG/24HR vaginal ring, , Disp: , Rfl:   •  hydrOXYzine (ATARAX) 10 MG tablet, Take 1 tablet by mouth 3 (Three) Times a Day As Needed for Anxiety., Disp: 30 tablet, Rfl: 1  •  levothyroxine (Synthroid) 50 MCG tablet, Take 1 tablet by mouth Every Morning., Disp: 30 tablet, Rfl: 5  •  metoprolol succinate XL (TOPROL-XL) 25 MG 24 hr tablet, Take 1 tablet by mouth Daily., Disp: 30 tablet, Rfl: 5  Social History:   Social History     Tobacco Use   • Smoking status: Never   • Smokeless tobacco: Never   • Tobacco comments:     no caffeine   Substance Use Topics   • Alcohol use: Yes     Comment: \"social\"      Family History:  Family History "   Problem Relation Age of Onset   • Depression Mother    • Hypertension Mother    • Arthritis Mother    • Hypertension Father    • Arthritis Sister    • Asthma Brother    • Depression Maternal Grandmother    • Diabetes type II Maternal Grandmother    • Hypertension Maternal Grandmother    • Arthritis Maternal Grandmother    • Diabetes type II Maternal Grandfather    • Hypertension Paternal Grandmother    • Alcohol abuse Paternal Grandfather    • Heart disease Other         Review of Systems    Constitutional: No weakness,fatigue, fever, rigors, chills   Eyes: No vision changes, eye pain   ENT/oropharynx: No difficulty swallowing, sore throat, epistaxis, changes in hearing   Cardiovascular:  Palpitations and shortness of breath   Respiratory: No shortness of breath, dyspnea on exertion, cough, wheezing hemoptysis   Gastrointestinal: No abdominal pain, nausea, vomiting, diarrhea, bloody stools   Genitourinary: No hematuria, dysuria   Neurological: No headache, tremors, numbness,  one-sided weakness    Musculoskeletal: No cramps, myalgias,  joint pain, joint swelling   Integument: No rash, edema           Constitutional:       Physical Exam   General:  Appears in no acute distress  Eyes: PERTL,  HEENT:  No JVD. Thyroid not visibly enlarged. No mucosal pallor or cyanosis  Respiratory: Respirations regular and unlabored at rest. BBS with good air entry in all fields. No crackles, rubs or wheezes auscultated  Cardiovascular: S1S2 Regular rate and rhythm. No murmur, rub or gallop auscultated. No carotid bruits. DP/PT pulses    . No pretibial pitting edema  Gastrointestinal: Abdomen soft, flat, non tender. Bowel sounds present. No hepatosplenomegaly. No ascites  Musculoskeletal: [employer] x4. No abnormal movements  Extremities: No digital clubbing or cyanosis  Skin: Color pink. Skin warm and dry to touch. No rashes  No xanthoma  Neuro: AAO x3 CN II-XII grossly intact          Procedures        Cardiographics  ECG:      Telemetry:    Echocardiogram:     Imaging  Chest X-ray:     Lab Review               @[email or web address]@              Assessment:/ Recommendations / Plan:   Patient Active Problem List   Diagnosis   • Anxiety   • Hypothyroid   • Psychiatric diagnosis   • Pre-eclampsia   • High blood pressure   • Hashimoto's disease   • Gestational diabetes   • Depression   • Chiari I malformation (HCC)   • Family history of early CAD   • Palpitations   • Fluctuating blood pressure   • Class 2 obesity without serious comorbidity with body mass index (BMI) of 36.0 to 36.9 in adult   • Shortness of breath                    ICD-10-CM ICD-9-CM   1. Palpitations  R00.2 785.1   2. Shortness of breath  R06.02 786.05     1. Palpitations  Restart Toprol at    2. Shortness of breath  Restart Toprol-XL      Labs/tests ordered for am      Karli Villagomez MD  2/8/2023, 16:52 EST      EMR Dragon/Transcription:   Dictated utilizing Dragon dictation

## 2023-02-08 PROBLEM — R06.02 SHORTNESS OF BREATH: Status: ACTIVE | Noted: 2023-02-08

## 2023-02-08 RX ORDER — METOPROLOL SUCCINATE 25 MG/1
25 TABLET, EXTENDED RELEASE ORAL DAILY
Qty: 30 TABLET | Refills: 5 | Status: SHIPPED | OUTPATIENT
Start: 2023-02-08 | End: 2023-03-15 | Stop reason: SDUPTHER

## 2023-02-17 ENCOUNTER — HOSPITAL ENCOUNTER (OUTPATIENT)
Dept: ULTRASOUND IMAGING | Facility: HOSPITAL | Age: 38
Discharge: HOME | End: 2023-02-17
Admitting: NURSE PRACTITIONER
Payer: COMMERCIAL

## 2023-02-17 DIAGNOSIS — R00.2 PALPITATIONS: ICD-10-CM

## 2023-02-17 DIAGNOSIS — E06.3 HASHIMOTO'S DISEASE: ICD-10-CM

## 2023-02-17 DIAGNOSIS — E03.8 HYPOTHYROIDISM DUE TO HASHIMOTO'S THYROIDITIS: ICD-10-CM

## 2023-02-17 DIAGNOSIS — E04.9 THYROID ENLARGEMENT: ICD-10-CM

## 2023-02-17 DIAGNOSIS — F41.8 SITUATIONAL ANXIETY: ICD-10-CM

## 2023-02-17 DIAGNOSIS — E06.3 HYPOTHYROIDISM DUE TO HASHIMOTO'S THYROIDITIS: ICD-10-CM

## 2023-02-17 PROCEDURE — 76536 US EXAM OF HEAD AND NECK: CPT

## 2023-02-20 NOTE — PROGRESS NOTES
Please mail letter to patient stating    Annie, the thyroid ultrasound shows consistent with your prior history of the Hashimoto's thyroiditis--and they also saw what appears to be a benign lymph node as well--if your symptoms persist please follow-up in the office so we can discuss further

## 2023-03-15 ENCOUNTER — TELEPHONE (OUTPATIENT)
Dept: CARDIOLOGY | Facility: CLINIC | Age: 38
End: 2023-03-15
Payer: COMMERCIAL

## 2023-03-15 RX ORDER — METOPROLOL SUCCINATE 25 MG/1
25 TABLET, EXTENDED RELEASE ORAL DAILY
Qty: 30 TABLET | Refills: 1 | Status: SHIPPED | OUTPATIENT
Start: 2023-03-15

## 2023-03-15 NOTE — TELEPHONE ENCOUNTER
Caller: Annie Oshea    Relationship: Self    Best call back number: [phone]    Requested Prescriptions:   Requested Prescriptions     Pending Prescriptions Disp Refills   • metoprolol succinate XL (TOPROL-XL) 25 MG 24 hr tablet 30 tablet 5     Sig: Take 1 tablet by mouth Daily.        Pharmacy where request should be sent: [address] [phone] [facility] [phone] [facility]     Additional details provided by patient: HAS ABOUT 2 WEEKS LEFT.    Does the patient have less than a 3 day supply:  [x] Yes  [] No    Would you like a call back once the refill request has been completed: [x] Yes [] No    If the office needs to give you a call back, can they leave a voicemail: [x] Yes [] No    Bushra Townsend Rep   03/15/23 12:47 EDT

## 2023-05-08 ENCOUNTER — TELEPHONE (OUTPATIENT)
Dept: CARDIOLOGY | Facility: CLINIC | Age: 38
End: 2023-05-08

## 2023-05-08 NOTE — TELEPHONE ENCOUNTER
Caller: Anine Oshea    Relationship to patient: Self    Best call back number: [phone]    Chief complaint: PATIENT HAD TO RESCHEDULE DUE TO WORK.     Type of visit: FOLLOW UP     Requested date: TIMEFRAME- AFTER June 10TH    If rescheduling, when is the original appointment: 12/11/23 FOR A SOONER APPOINTMENT

## 2023-05-22 ENCOUNTER — OFFICE VISIT (OUTPATIENT)
Dept: FAMILY MEDICINE CLINIC | Facility: CLINIC | Age: 38
End: 2023-05-22
Payer: COMMERCIAL

## 2023-05-22 VITALS
DIASTOLIC BLOOD PRESSURE: 74 MMHG | HEART RATE: 120 BPM | SYSTOLIC BLOOD PRESSURE: 128 MMHG | WEIGHT: 250 LBS | HEIGHT: 70 IN | OXYGEN SATURATION: 97 % | TEMPERATURE: 97.7 F | BODY MASS INDEX: 35.79 KG/M2

## 2023-05-22 DIAGNOSIS — Z00.00 WELLNESS EXAMINATION: Primary | ICD-10-CM

## 2023-05-22 DIAGNOSIS — E66.9 CLASS 2 OBESITY WITHOUT SERIOUS COMORBIDITY WITH BODY MASS INDEX (BMI) OF 35.0 TO 35.9 IN ADULT, UNSPECIFIED OBESITY TYPE: ICD-10-CM

## 2023-05-22 DIAGNOSIS — E06.3 HYPOTHYROIDISM DUE TO HASHIMOTO'S THYROIDITIS: ICD-10-CM

## 2023-05-22 DIAGNOSIS — N23 KIDNEY PAIN: ICD-10-CM

## 2023-05-22 DIAGNOSIS — R31.0 GROSS HEMATURIA: ICD-10-CM

## 2023-05-22 DIAGNOSIS — E03.8 HYPOTHYROIDISM DUE TO HASHIMOTO'S THYROIDITIS: ICD-10-CM

## 2023-05-22 LAB
BILIRUB BLD-MCNC: NEGATIVE MG/DL
CLARITY, POC: ABNORMAL
COLOR UR: ABNORMAL
EXPIRATION DATE: ABNORMAL
GLUCOSE UR STRIP-MCNC: NEGATIVE MG/DL
KETONES UR QL: NEGATIVE
LEUKOCYTE EST, POC: NEGATIVE
Lab: ABNORMAL
NITRITE UR-MCNC: NEGATIVE MG/ML
PH UR: 5.5 [PH] (ref 5–8)
PROT UR STRIP-MCNC: NEGATIVE MG/DL
RBC # UR STRIP: ABNORMAL /UL
SP GR UR: 1.01 (ref 1–1.03)
UROBILINOGEN UR QL: ABNORMAL

## 2023-05-22 PROCEDURE — 80053 COMPREHEN METABOLIC PANEL: CPT | Performed by: NURSE PRACTITIONER

## 2023-05-22 PROCEDURE — 84443 ASSAY THYROID STIM HORMONE: CPT | Performed by: NURSE PRACTITIONER

## 2023-05-22 NOTE — PROGRESS NOTES
"Renal stoneChief Complaint  Employment Physical (She is also having some kidney pain. )    Subjective            Annie Oshea presents to [facility]  History of Present Illness  Pt is here today for the annual PE/wellness for work and needs paperwork signed and filled out    Not a smoker    Sees a GYN for her paps and her well woman exams    Also reports that she has been experiencing right kidney pain intermittently for several days and no UTI symptoms no nausea no vomiting no reports of constipation or diarrhea and reports the pain is like stabbing in the kidney area into the flank and that it is different than what she has felt or experienced with her back in the past and reminded her more of when she had past prior history of kidney stones              PHQ-2 Total Score: 0  PHQ-9 Total Score: 0    Past Medical History:   Diagnosis Date   • Anxiety    • Cardiomyopathy     PER DR JIMENEZ OFFICE NOTE 4/22/22-DD   • Chiari I malformation    • Depression    • Gestational diabetes    • Hashimoto's disease    • Hypothyroid    • Kidney stones    • Palpitations     PER DR JIMENEZ OFFICE NOTE 4/22/22-DD   • Pre-eclampsia    • PVC (premature ventricular contraction)    • Sinus tachycardia        No Known Allergies     Past Surgical History:   Procedure Laterality Date   • KIDNEY STONE SURGERY     • MOLE REMOVAL     • WISDOM TOOTH EXTRACTION          Social History     Tobacco Use   • Smoking status: Never     Passive exposure: Never   • Smokeless tobacco: Never   • Tobacco comments:     no caffeine   Vaping Use   • Vaping Use: Never used   Substance Use Topics   • Alcohol use: Yes     Comment: \"social\"   • Drug use: Never       Family History   Problem Relation Age of Onset   • Depression Mother    • Hypertension Mother    • Arthritis Mother    • Hypertension Father    • Arthritis Sister    • Asthma Brother    • Depression Maternal Grandmother    • Diabetes type II Maternal Grandmother  "   • Hypertension Maternal Grandmother    • Arthritis Maternal Grandmother    • Diabetes type II Maternal Grandfather    • Hypertension Paternal Grandmother    • Alcohol abuse Paternal Grandfather    • Heart disease Other         Health Maintenance Due   Topic Date Due   • PAP SMEAR  Never done   • ANNUAL PHYSICAL  04/06/2023        Current Outpatient Medications on File Prior to Visit   Medication Sig   • etonogestrel-ethinyl estradiol (NUVARING) 0.12-0.015 MG/24HR vaginal ring    • hydrOXYzine (ATARAX) 10 MG tablet Take 1 tablet by mouth 3 (Three) Times a Day As Needed for Anxiety.   • levothyroxine (Synthroid) 50 MCG tablet Take 1 tablet by mouth Every Morning.   • metoprolol succinate XL (TOPROL-XL) 25 MG 24 hr tablet Take 1 tablet by mouth Daily.     No current facility-administered medications on file prior to visit.       Immunization History   Administered Date(s) Administered   • COVID-19 (MODERNA) Monovalent Original Booster 03/10/2021, 04/07/2021   • Flu Vaccine Quad PF >36MO 10/27/2021   • FluLaval/Fluzone >6mos 10/27/2021   • Fluzone Quad >6mos (Multi-dose) 09/15/2020   • Influenza, Unspecified 11/07/2019, 09/24/2020   • Tdap 04/06/2022       Review of Systems   Constitutional: Positive for fatigue and unexpected weight gain. Negative for chills, fever and unexpected weight loss.   HENT: Negative for trouble swallowing.    Eyes: Negative for blurred vision and double vision.   Respiratory: Negative for choking and shortness of breath.    Cardiovascular: Negative for chest pain.   Gastrointestinal: Negative for abdominal pain and blood in stool.   Endocrine: Negative for polydipsia, polyphagia and polyuria.   Genitourinary: Positive for flank pain. Negative for difficulty urinating, dysuria and menstrual problem.        Right kidney    Musculoskeletal: Positive for back pain.        Right kidney    Neurological: Negative for dizziness, seizures, syncope and light-headedness.   Hematological: Does not  "bruise/bleed easily.   Psychiatric/Behavioral: Negative for self-injury, suicidal ideas and depressed mood. The patient is nervous/anxious.         Objective     /74 (BP Location: Left arm, Patient Position: Sitting, Cuff Size: Adult)   Pulse 120   Temp 97.7 °F (36.5 °C) (Temporal)   Ht 177.8 cm (70\")   Wt 113 kg (250 lb)   SpO2 97%   BMI 35.87 kg/m²       Physical Exam  Vitals and nursing note reviewed.   Constitutional:       Appearance: Normal appearance. She is obese.   HENT:      Head: Normocephalic.      Right Ear: External ear normal.      Left Ear: External ear normal.      Nose: Nose normal.      Mouth/Throat:      Mouth: Mucous membranes are moist.   Eyes:      Pupils: Pupils are equal, round, and reactive to light.   Cardiovascular:      Rate and Rhythm: Normal rate and regular rhythm.      Heart sounds: Normal heart sounds.   Pulmonary:      Effort: Pulmonary effort is normal.      Breath sounds: Normal breath sounds.   Abdominal:      Palpations: Abdomen is soft.      Tenderness: There is right CVA tenderness.   Musculoskeletal:         General: Normal range of motion.      Cervical back: Normal range of motion and neck supple.   Skin:     General: Skin is warm and dry.   Neurological:      Mental Status: She is alert and oriented to person, place, and time.   Psychiatric:         Mood and Affect: Mood normal.         Behavior: Behavior normal.         Thought Content: Thought content normal.         Judgment: Judgment normal.         Result Review :           POCT urinalysis dipstick, automated (05/22/2023 17:21)                 Assessment and Plan      Diagnoses and all orders for this visit:    1. Wellness examination (Primary)  -     POCT urinalysis dipstick, automated    2. Class 2 obesity without serious comorbidity with body mass index (BMI) of 35.0 to 35.9 in adult, unspecified obesity type  Comments:  diet and exercise counseled     3. Hypothyroidism due to Hashimoto's " thyroiditis  -     TSH    4. Kidney pain  -     Comprehensive Metabolic Panel  -     CT Abdomen Pelvis Stone Protocol; Future    5. Gross hematuria  -     Comprehensive Metabolic Panel  -     CT Abdomen Pelvis Stone Protocol; Future    We will proceed with a CT per protocol since she has moderate to gross hematuria and the right kidney and flank pain and then also obtain the thyroid follow-up lab and filled out the paperwork for her        Follow Up     Return if symptoms worsen or fail to improve.

## 2023-05-22 NOTE — PROGRESS NOTES
Venipuncture Blood Specimen Collection  Venipuncture performed in left arm by Shira Tracy with good hemostasis. Patient tolerated the procedure well without complications.   05/22/23   Shira Tracy

## 2023-05-23 ENCOUNTER — PATIENT MESSAGE (OUTPATIENT)
Dept: FAMILY MEDICINE CLINIC | Facility: CLINIC | Age: 38
End: 2023-05-23
Payer: COMMERCIAL

## 2023-05-23 DIAGNOSIS — E03.8 HYPOTHYROIDISM DUE TO HASHIMOTO'S THYROIDITIS: ICD-10-CM

## 2023-05-23 DIAGNOSIS — E06.3 HYPOTHYROIDISM DUE TO HASHIMOTO'S THYROIDITIS: ICD-10-CM

## 2023-05-23 LAB
ALBUMIN SERPL-MCNC: 4.2 G/DL (ref 3.5–5.2)
ALBUMIN/GLOB SERPL: 1.6 G/DL
ALP SERPL-CCNC: 83 U/L (ref 39–117)
ALT SERPL W P-5'-P-CCNC: 9 U/L (ref 1–33)
ANION GAP SERPL CALCULATED.3IONS-SCNC: 11.9 MMOL/L (ref 5–15)
AST SERPL-CCNC: 16 U/L (ref 1–32)
BILIRUB SERPL-MCNC: 0.3 MG/DL (ref 0–1.2)
BUN SERPL-MCNC: 6 MG/DL (ref 6–20)
BUN/CREAT SERPL: 8.8 (ref 7–25)
CALCIUM SPEC-SCNC: 9.5 MG/DL (ref 8.6–10.5)
CHLORIDE SERPL-SCNC: 103 MMOL/L (ref 98–107)
CO2 SERPL-SCNC: 23.1 MMOL/L (ref 22–29)
CREAT SERPL-MCNC: 0.68 MG/DL (ref 0.57–1)
EGFRCR SERPLBLD CKD-EPI 2021: 115.2 ML/MIN/1.73
GLOBULIN UR ELPH-MCNC: 2.7 GM/DL
GLUCOSE SERPL-MCNC: 85 MG/DL (ref 65–99)
POTASSIUM SERPL-SCNC: 4.6 MMOL/L (ref 3.5–5.2)
PROT SERPL-MCNC: 6.9 G/DL (ref 6–8.5)
SODIUM SERPL-SCNC: 138 MMOL/L (ref 136–145)
TSH SERPL DL<=0.05 MIU/L-ACNC: 5.26 UIU/ML (ref 0.27–4.2)

## 2023-05-23 NOTE — PROGRESS NOTES
Please mail letter to patient stating    Annie, your TSH level is just slightly elevated at 5.260 and normal would be 4.200 and less and your comprehensive panel shows normal kidney and liver function test normal electrolytes and normal glucose    Please let me know if you have been faithfully taking your thyroid medicine every single day for the past couple of months and if you have then we need to increase the dose and if you have not then I need to know that as well please

## 2023-05-25 RX ORDER — LEVOTHYROXINE SODIUM 0.07 MG/1
75 TABLET ORAL
Qty: 30 TABLET | Refills: 2 | Status: SHIPPED | OUTPATIENT
Start: 2023-05-25

## 2023-05-31 RX ORDER — METOPROLOL SUCCINATE 25 MG/1
TABLET, EXTENDED RELEASE ORAL
Qty: 30 TABLET | Refills: 1 | Status: SHIPPED | OUTPATIENT
Start: 2023-05-31

## 2023-06-12 ENCOUNTER — OFFICE VISIT (OUTPATIENT)
Dept: CARDIOLOGY | Facility: CLINIC | Age: 38
End: 2023-06-12
Payer: COMMERCIAL

## 2023-06-12 VITALS
HEIGHT: 69 IN | HEART RATE: 82 BPM | DIASTOLIC BLOOD PRESSURE: 78 MMHG | SYSTOLIC BLOOD PRESSURE: 127 MMHG | BODY MASS INDEX: 37.03 KG/M2 | WEIGHT: 250 LBS

## 2023-06-12 DIAGNOSIS — I42.8 NON-ISCHEMIC CARDIOMYOPATHY: ICD-10-CM

## 2023-06-12 DIAGNOSIS — R00.2 PALPITATIONS: Primary | ICD-10-CM

## 2023-06-12 DIAGNOSIS — I49.3 PVC (PREMATURE VENTRICULAR CONTRACTION): ICD-10-CM

## 2023-06-12 PROCEDURE — 99213 OFFICE O/P EST LOW 20 MIN: CPT | Performed by: INTERNAL MEDICINE

## 2023-06-12 NOTE — PROGRESS NOTES
FOLLOW UP    Subjective:        Annie Oshea is a 37 y.o. female who here for follow up    CC  Follow-up palpitation PVCs  HPI  37-year-old here for the follow-up for palpitation and PVCs nonischemic cardiomyopathy denies any chest pains or tightness in the chest     Problems Addressed this Visit          Cardiac and Vasculature    Palpitations - Primary    Non-ischemic cardiomyopathy    PVC (premature ventricular contraction)     Diagnoses         Codes Comments    Palpitations    -  Primary ICD-10-CM: R00.2  ICD-9-CM: 785.1     PVC (premature ventricular contraction)     ICD-10-CM: I49.3  ICD-9-CM: 427.69     Non-ischemic cardiomyopathy     ICD-10-CM: I42.8  ICD-9-CM: 425.4           .    The following portions of the patient's history were reviewed and updated as appropriate: allergies, current medications, past family history, past medical history, past social history, past surgical history and problem list.    Past Medical History:   Diagnosis Date    Anxiety     Cardiomyopathy     PER DR JIMENEZ OFFICE NOTE 4/22/22-DD    Chiari I malformation     Depression     Gestational diabetes     Hashimoto's disease     Hypothyroid     Kidney stones     Palpitations     PER DR JIMENEZ OFFICE NOTE 4/22/22-DD    Pre-eclampsia     PVC (premature ventricular contraction)     Sinus tachycardia      reports that she has never smoked. She has never been exposed to tobacco smoke. She has never used smokeless tobacco. She reports current alcohol use. She reports that she does not use drugs.   Family History   Problem Relation Age of Onset    Depression Mother     Hypertension Mother     Arthritis Mother     Hypertension Father     Arthritis Sister     Asthma Brother     Depression Maternal Grandmother     Diabetes type II Maternal Grandmother     Hypertension Maternal Grandmother     Arthritis Maternal Grandmother     Diabetes type II Maternal Grandfather     Hypertension Paternal Grandmother     Alcohol abuse Paternal  "Grandfather     Heart disease Other        Review of Systems  Constitutional: No wt loss, fever, fatigue  Gastrointestinal: No nausea, abdominal pain  Behavioral/Psych: No insomnia or anxiety   Cardiovascular no chest pains or tightness in the chest  Objective:       Physical Exam           Physical Exam  /78   Pulse 82   Ht 175.3 cm (69\")   Wt 113 kg (250 lb)   LMP 05/22/2023   BMI 36.92 kg/m²     General appearance: No acute changes   Eyes: Sclerae conjunctivae normal, pupils reactive   HENT: Atraumatic; oropharynx clear with moist mucous membranes and no mucosal ulcerations;  Neck: Trachea midline; NECK, supple, no thyromegaly or lymphadenopathy   Lungs: Normal size and shape, normal breath sounds, equal distribution of air, no rales and rhonchi   CV: S1-S2 regular, no murmurs, no rub, no gallop   Abdomen: Soft, nontender; no masses , no abnormal abdominal sounds   Extremities: No deformity , normal color , no peripheral edema   Skin: Normal temperature, turgor and texture; no rash, ulcers  Psych: Appropriate affect, alert and oriented to person, place and time           Procedures      Echocardiogram:        Current Outpatient Medications:     etonogestrel-ethinyl estradiol (NUVARING) 0.12-0.015 MG/24HR vaginal ring, , Disp: , Rfl:     hydrOXYzine (ATARAX) 10 MG tablet, Take 1 tablet by mouth 3 (Three) Times a Day As Needed for Anxiety., Disp: 30 tablet, Rfl: 1    levothyroxine (Synthroid) 75 MCG tablet, Take 1 tablet by mouth Every Morning., Disp: 30 tablet, Rfl: 2    metoprolol succinate XL (TOPROL-XL) 25 MG 24 hr tablet, TAKE 1 TABLET BY MOUTH EVERY DAY, Disp: 30 tablet, Rfl: 1   Assessment:        Patient Active Problem List   Diagnosis    Anxiety    Hypothyroid    Psychiatric diagnosis    Pre-eclampsia    High blood pressure    Hashimoto's disease    Gestational diabetes    Depression    Chiari I malformation    Family history of early CAD    Palpitations    Fluctuating blood pressure    Class 2 " obesity without serious comorbidity with body mass index (BMI) of 36.0 to 36.9 in adult    Non-ischemic cardiomyopathy    Shortness of breath    PVC (premature ventricular contraction)               Plan:            ICD-10-CM ICD-9-CM   1. Palpitations  R00.2 785.1   2. PVC (premature ventricular contraction)  I49.3 427.69   3. Non-ischemic cardiomyopathy  I42.8 425.4     1. Palpitations  Palpitations under control    2. PVC (premature ventricular contraction)  Asymptomatic    3. Non-ischemic cardiomyopathy  Compensated      1 YR  COUNSELING:    Annie Pompa was given to patient for the following topics: diagnostic results, risk factor reductions, impressions, risks and benefits of treatment options and importance of treatment compliance .       SMOKING COUNSELING:        Dictated using Dragon dictation

## 2023-06-13 PROBLEM — I49.3 PVC (PREMATURE VENTRICULAR CONTRACTION): Status: ACTIVE | Noted: 2023-06-13

## 2023-08-18 ENCOUNTER — CLINICAL SUPPORT (OUTPATIENT)
Dept: FAMILY MEDICINE CLINIC | Facility: CLINIC | Age: 38
End: 2023-08-18
Payer: COMMERCIAL

## 2023-08-18 DIAGNOSIS — E06.3 HYPOTHYROIDISM DUE TO HASHIMOTO'S THYROIDITIS: ICD-10-CM

## 2023-08-18 DIAGNOSIS — E03.8 HYPOTHYROIDISM DUE TO HASHIMOTO'S THYROIDITIS: ICD-10-CM

## 2023-08-18 LAB — TSH SERPL DL<=0.05 MIU/L-ACNC: 2.81 UIU/ML (ref 0.27–4.2)

## 2023-08-18 PROCEDURE — 84443 ASSAY THYROID STIM HORMONE: CPT | Performed by: NURSE PRACTITIONER

## 2023-08-18 PROCEDURE — 36415 COLL VENOUS BLD VENIPUNCTURE: CPT | Performed by: NURSE PRACTITIONER

## 2023-08-18 NOTE — PROGRESS NOTES
Venipuncture Blood Specimen Collection  Venipuncture performed in left arm by Shira Tracy with good hemostasis. Patient tolerated the procedure well without complications.   08/18/23   Shira Tracy

## 2023-08-21 DIAGNOSIS — E03.8 HYPOTHYROIDISM DUE TO HASHIMOTO'S THYROIDITIS: ICD-10-CM

## 2023-08-21 DIAGNOSIS — E06.3 HYPOTHYROIDISM DUE TO HASHIMOTO'S THYROIDITIS: ICD-10-CM

## 2023-08-21 RX ORDER — METOPROLOL SUCCINATE 25 MG/1
TABLET, EXTENDED RELEASE ORAL
Qty: 30 TABLET | Refills: 5 | Status: SHIPPED | OUTPATIENT
Start: 2023-08-21

## 2023-08-21 RX ORDER — LEVOTHYROXINE SODIUM 0.07 MG/1
75 TABLET ORAL
Qty: 30 TABLET | Refills: 2 | Status: SHIPPED | OUTPATIENT
Start: 2023-08-21

## 2023-08-21 NOTE — PROGRESS NOTES
Please mail letter to patient stating    Annie, your thyroid levels are in normal range and I would like for you to continue taking the same dose of your levothyroxine

## 2023-12-28 DIAGNOSIS — E06.3 HYPOTHYROIDISM DUE TO HASHIMOTO'S THYROIDITIS: ICD-10-CM

## 2023-12-28 DIAGNOSIS — E03.8 HYPOTHYROIDISM DUE TO HASHIMOTO'S THYROIDITIS: ICD-10-CM

## 2023-12-28 RX ORDER — LEVOTHYROXINE SODIUM 0.07 MG/1
75 TABLET ORAL
Qty: 30 TABLET | Refills: 2 | Status: SHIPPED | OUTPATIENT
Start: 2023-12-28

## 2024-03-25 ENCOUNTER — OFFICE VISIT (OUTPATIENT)
Dept: FAMILY MEDICINE CLINIC | Facility: CLINIC | Age: 39
End: 2024-03-25
Payer: COMMERCIAL

## 2024-03-25 VITALS
WEIGHT: 248 LBS | BODY MASS INDEX: 36.73 KG/M2 | DIASTOLIC BLOOD PRESSURE: 92 MMHG | TEMPERATURE: 97.7 F | SYSTOLIC BLOOD PRESSURE: 132 MMHG | HEIGHT: 69 IN | OXYGEN SATURATION: 98 % | HEART RATE: 104 BPM

## 2024-03-25 DIAGNOSIS — L98.9 LESION OF FACE: Primary | ICD-10-CM

## 2024-03-25 PROCEDURE — 99213 OFFICE O/P EST LOW 20 MIN: CPT | Performed by: FAMILY MEDICINE

## 2024-03-25 RX ORDER — ETONOGESTREL AND ETHINYL ESTRADIOL VAGINAL .015; .12 MG/D; MG/D
1 RING VAGINAL
COMMUNITY
Start: 2024-03-02

## 2024-03-25 NOTE — PROGRESS NOTES
"Chief Complaint    Eye Problem (Bump above left eyebrow, started about 2 months ago, now it is very painful)    Subjective      Annie Oshea presents to [facility]    History of Present Illness    1.) LESION OF LEFT SUPERIOR EYEBROW : Onset-2 months ago.  Recent onset of pain.  Initially no sxs. Nodule located just superior to lateral left eyebrow.  No erythema of skin.    Objective     Vital Signs:     /92 (BP Location: Left arm)   Pulse 104   Temp 97.7 °F (36.5 °C)   Ht 175.3 cm (69\")   Wt 112 kg (248 lb)   SpO2 98%   BMI 36.62 kg/m²       Physical Exam  Vitals reviewed.   Constitutional:       General: She is not in acute distress.     Appearance: Normal appearance. She is well-developed.   HENT:      Head: Normocephalic and atraumatic.        Comments: Pea-sized nodule noted of area above. No significant tenderness with palpation.     Right Ear: Hearing and external ear normal.      Left Ear: Hearing and external ear normal.      Nose: Nose normal.   Eyes:      General: Lids are normal.         Right eye: No discharge.         Left eye: No discharge.      Conjunctiva/sclera: Conjunctivae normal.   Pulmonary:      Effort: Pulmonary effort is normal.   Abdominal:      General: There is no distension.   Musculoskeletal:         General: No swelling.      Cervical back: Neck supple.   Skin:     Coloration: Skin is not jaundiced.      Findings: No erythema.   Neurological:      Mental Status: She is alert. Mental status is at baseline.   Psychiatric:         Mood and Affect: Mood and affect normal.         Thought Content: Thought content normal.     Assessment and Plan     Diagnoses and all orders for this visit:    1. Lesion of face (Primary)  Comments:  Referred to derm for eval and recs.  Orders:  -     Cancel: Ambulatory Referral to Plastic Surgery  -     Ambulatory Referral to Dermatology    Follow Up : PRN    Patient was given instructions and counseling regarding " her condition or for health maintenance advice. Please see specific information pulled into the AVS if appropriate.

## 2024-04-11 ENCOUNTER — TELEPHONE (OUTPATIENT)
Dept: CARDIOLOGY | Facility: CLINIC | Age: 39
End: 2024-04-11

## 2024-05-16 DIAGNOSIS — E06.3 HYPOTHYROIDISM DUE TO HASHIMOTO'S THYROIDITIS: ICD-10-CM

## 2024-05-16 DIAGNOSIS — E03.8 HYPOTHYROIDISM DUE TO HASHIMOTO'S THYROIDITIS: ICD-10-CM

## 2024-05-16 RX ORDER — METOPROLOL SUCCINATE 25 MG/1
TABLET, EXTENDED RELEASE ORAL
Qty: 30 TABLET | Refills: 5 | OUTPATIENT
Start: 2024-05-16

## 2024-05-16 RX ORDER — LEVOTHYROXINE SODIUM 0.07 MG/1
75 TABLET ORAL
Qty: 30 TABLET | Refills: 0 | Status: SHIPPED | OUTPATIENT
Start: 2024-05-16 | End: 2024-05-18 | Stop reason: SDUPTHER

## 2024-05-16 NOTE — TELEPHONE ENCOUNTER
Courtesy refill--x 1 month--but I last saw her in May 2023--and she needs to please F/U and we will need labs

## 2024-05-17 RX ORDER — METOPROLOL SUCCINATE 25 MG/1
25 TABLET, EXTENDED RELEASE ORAL EVERY MORNING
Qty: 30 TABLET | Refills: 0 | Status: SHIPPED | OUTPATIENT
Start: 2024-05-17

## 2024-05-18 ENCOUNTER — OFFICE VISIT (OUTPATIENT)
Dept: FAMILY MEDICINE CLINIC | Facility: CLINIC | Age: 39
End: 2024-05-18
Payer: COMMERCIAL

## 2024-05-18 VITALS
BODY MASS INDEX: 36.73 KG/M2 | DIASTOLIC BLOOD PRESSURE: 74 MMHG | WEIGHT: 248 LBS | HEART RATE: 102 BPM | HEIGHT: 69 IN | TEMPERATURE: 98 F | OXYGEN SATURATION: 97 % | SYSTOLIC BLOOD PRESSURE: 130 MMHG

## 2024-05-18 DIAGNOSIS — E03.8 HYPOTHYROIDISM DUE TO HASHIMOTO'S THYROIDITIS: ICD-10-CM

## 2024-05-18 DIAGNOSIS — Z00.00 ANNUAL PHYSICAL EXAM: Primary | ICD-10-CM

## 2024-05-18 DIAGNOSIS — E66.01 CLASS 2 SEVERE OBESITY WITH SERIOUS COMORBIDITY AND BODY MASS INDEX (BMI) OF 36.0 TO 36.9 IN ADULT, UNSPECIFIED OBESITY TYPE: ICD-10-CM

## 2024-05-18 DIAGNOSIS — Z13.220 SCREENING, LIPID: ICD-10-CM

## 2024-05-18 DIAGNOSIS — E06.3 HYPOTHYROIDISM DUE TO HASHIMOTO'S THYROIDITIS: ICD-10-CM

## 2024-05-18 DIAGNOSIS — F41.8 SITUATIONAL ANXIETY: ICD-10-CM

## 2024-05-18 PROBLEM — N81.10 CYSTOCELE WITHOUT UTERINE PROLAPSE: Status: ACTIVE | Noted: 2024-05-18

## 2024-05-18 LAB
ALBUMIN SERPL-MCNC: 4 G/DL (ref 3.5–5.2)
ALBUMIN/GLOB SERPL: 1.6 G/DL
ALP SERPL-CCNC: 68 U/L (ref 39–117)
ALT SERPL W P-5'-P-CCNC: 8 U/L (ref 1–33)
ANION GAP SERPL CALCULATED.3IONS-SCNC: 11 MMOL/L (ref 5–15)
AST SERPL-CCNC: 9 U/L (ref 1–32)
BASOPHILS # BLD AUTO: 0.03 10*3/MM3 (ref 0–0.2)
BASOPHILS NFR BLD AUTO: 0.4 % (ref 0–1.5)
BILIRUB SERPL-MCNC: 0.3 MG/DL (ref 0–1.2)
BILIRUB UR QL STRIP: NEGATIVE
BUN SERPL-MCNC: 9 MG/DL (ref 6–20)
BUN/CREAT SERPL: 16.4 (ref 7–25)
CALCIUM SPEC-SCNC: 8.7 MG/DL (ref 8.6–10.5)
CHLORIDE SERPL-SCNC: 109 MMOL/L (ref 98–107)
CHOLEST SERPL-MCNC: 138 MG/DL (ref 0–200)
CLARITY UR: CLEAR
CO2 SERPL-SCNC: 21 MMOL/L (ref 22–29)
COLOR UR: YELLOW
CREAT SERPL-MCNC: 0.55 MG/DL (ref 0.57–1)
DEPRECATED RDW RBC AUTO: 40.3 FL (ref 37–54)
EGFRCR SERPLBLD CKD-EPI 2021: 120.5 ML/MIN/1.73
EOSINOPHIL # BLD AUTO: 0.06 10*3/MM3 (ref 0–0.4)
EOSINOPHIL NFR BLD AUTO: 0.8 % (ref 0.3–6.2)
ERYTHROCYTE [DISTWIDTH] IN BLOOD BY AUTOMATED COUNT: 12.3 % (ref 12.3–15.4)
GLOBULIN UR ELPH-MCNC: 2.5 GM/DL
GLUCOSE SERPL-MCNC: 83 MG/DL (ref 65–99)
GLUCOSE UR STRIP-MCNC: NEGATIVE MG/DL
HCT VFR BLD AUTO: 40.3 % (ref 34–46.6)
HDLC SERPL-MCNC: 58 MG/DL (ref 40–60)
HGB BLD-MCNC: 13.3 G/DL (ref 12–15.9)
HGB UR QL STRIP.AUTO: NEGATIVE
HOLD SPECIMEN: NORMAL
IMM GRANULOCYTES # BLD AUTO: 0.02 10*3/MM3 (ref 0–0.05)
IMM GRANULOCYTES NFR BLD AUTO: 0.3 % (ref 0–0.5)
KETONES UR QL STRIP: NEGATIVE
LDLC SERPL CALC-MCNC: 63 MG/DL (ref 0–100)
LDLC/HDLC SERPL: 1.07 {RATIO}
LEUKOCYTE ESTERASE UR QL STRIP.AUTO: NEGATIVE
LYMPHOCYTES # BLD AUTO: 2.19 10*3/MM3 (ref 0.7–3.1)
LYMPHOCYTES NFR BLD AUTO: 28.1 % (ref 19.6–45.3)
MCH RBC QN AUTO: 30 PG (ref 26.6–33)
MCHC RBC AUTO-ENTMCNC: 33 G/DL (ref 31.5–35.7)
MCV RBC AUTO: 90.8 FL (ref 79–97)
MONOCYTES # BLD AUTO: 0.43 10*3/MM3 (ref 0.1–0.9)
MONOCYTES NFR BLD AUTO: 5.5 % (ref 5–12)
NEUTROPHILS NFR BLD AUTO: 5.07 10*3/MM3 (ref 1.7–7)
NEUTROPHILS NFR BLD AUTO: 64.9 % (ref 42.7–76)
NITRITE UR QL STRIP: NEGATIVE
NRBC BLD AUTO-RTO: 0 /100 WBC (ref 0–0.2)
PH UR STRIP.AUTO: 8 [PH] (ref 5–8)
PLATELET # BLD AUTO: 337 10*3/MM3 (ref 140–450)
PMV BLD AUTO: 10.8 FL (ref 6–12)
POTASSIUM SERPL-SCNC: 4.5 MMOL/L (ref 3.5–5.2)
PROT SERPL-MCNC: 6.5 G/DL (ref 6–8.5)
PROT UR QL STRIP: NEGATIVE
RBC # BLD AUTO: 4.44 10*6/MM3 (ref 3.77–5.28)
SODIUM SERPL-SCNC: 141 MMOL/L (ref 136–145)
SP GR UR STRIP: 1.02 (ref 1–1.03)
T4 FREE SERPL-MCNC: 1.3 NG/DL (ref 0.93–1.7)
TRIGL SERPL-MCNC: 91 MG/DL (ref 0–150)
TSH SERPL DL<=0.05 MIU/L-ACNC: 5.43 UIU/ML (ref 0.27–4.2)
UROBILINOGEN UR QL STRIP: NORMAL
VLDLC SERPL-MCNC: 17 MG/DL (ref 5–40)
WBC NRBC COR # BLD AUTO: 7.8 10*3/MM3 (ref 3.4–10.8)

## 2024-05-18 PROCEDURE — 36415 COLL VENOUS BLD VENIPUNCTURE: CPT | Performed by: NURSE PRACTITIONER

## 2024-05-18 PROCEDURE — 80053 COMPREHEN METABOLIC PANEL: CPT | Performed by: NURSE PRACTITIONER

## 2024-05-18 PROCEDURE — 81003 URINALYSIS AUTO W/O SCOPE: CPT | Performed by: NURSE PRACTITIONER

## 2024-05-18 PROCEDURE — 84439 ASSAY OF FREE THYROXINE: CPT | Performed by: NURSE PRACTITIONER

## 2024-05-18 PROCEDURE — 84443 ASSAY THYROID STIM HORMONE: CPT | Performed by: NURSE PRACTITIONER

## 2024-05-18 PROCEDURE — 99395 PREV VISIT EST AGE 18-39: CPT | Performed by: NURSE PRACTITIONER

## 2024-05-18 PROCEDURE — 80061 LIPID PANEL: CPT | Performed by: NURSE PRACTITIONER

## 2024-05-18 PROCEDURE — 85025 COMPLETE CBC W/AUTO DIFF WBC: CPT | Performed by: NURSE PRACTITIONER

## 2024-05-18 RX ORDER — LEVOTHYROXINE SODIUM 0.07 MG/1
75 TABLET ORAL
Qty: 30 TABLET | Refills: 11 | Status: SHIPPED | OUTPATIENT
Start: 2024-05-18

## 2024-05-18 RX ORDER — D-METHORPHAN/PE/ACETAMINOPHEN 20-10-500
1 POWDER IN PACKET (EA) ORAL DAILY
COMMUNITY
Start: 2024-04-26

## 2024-05-18 RX ORDER — MULTIVITAMIN
1 TABLET ORAL DAILY
COMMUNITY
Start: 2024-04-26

## 2024-05-18 RX ORDER — HYDROXYZINE HYDROCHLORIDE 10 MG/1
10 TABLET, FILM COATED ORAL 3 TIMES DAILY PRN
Qty: 30 TABLET | Refills: 5 | Status: SHIPPED | OUTPATIENT
Start: 2024-05-18

## 2024-05-18 RX ORDER — CHLORHEXIDINE GLUCONATE 4 %
2 LIQUID (ML) TOPICAL DAILY
COMMUNITY
Start: 2024-04-26

## 2024-05-18 NOTE — PROGRESS NOTES
Venipuncture Blood Specimen Collection  Venipuncture performed in left arm by Manda Chavez with good hemostasis. Patient tolerated the procedure well without complications.   05/18/24   Manda Chavez

## 2024-05-18 NOTE — PROGRESS NOTES
Chief Complaint  Hypothyroidism (Labs and med refill)    Subjective            Annie Oshea presents to [facility]  History of Present Illness  Patient is here today for her annual wellness biometric form will need to be filled out as she had this done last year on 5/22/2023 and needs it this month for her work    Also she is here for medication refills regarding the levothyroxine for hypothyroidism related to Hashimoto's thyroiditis-and she does have some level of fatigue at times but she is a single mother of 3 children and sometimes under quite a bit of stress-but overall does not report any hair or nail skin issues and tolerates medication well with no side effects no issues reported overall stable    Also medication refill on hydroxyzine for the situational anxiety: Does very well on this with no side effects no issues reported no SI/HI overall receives good efficacy when utilized and is stable    Does still see cardiology regarding the palpitations    She is not a smoker    And no immunizations are indicated other than if she decides to take an updated COVID-vaccine and that would be at the pharmacy level    And she goes to OB/GYN for her Pap smears      PHQ-2 Total Score: 0  PHQ-9 Total Score: 0    Past Medical History:   Diagnosis Date    Anxiety     Cardiomyopathy     PER DR JIMENEZ OFFICE NOTE 4/22/22-DD    Chiari I malformation     Depression     Gestational diabetes     Hashimoto's disease     Hypothyroid     Kidney stones     Palpitations     PER DR JIMENEZ OFFICE NOTE 4/22/22-DD    Pre-eclampsia     PVC (premature ventricular contraction)     Sinus tachycardia        No Known Allergies     Past Surgical History:   Procedure Laterality Date    KIDNEY STONE SURGERY      MOLE REMOVAL      WISDOM TOOTH EXTRACTION          Social History     Tobacco Use    Smoking status: Never     Passive exposure: Never    Smokeless tobacco: Never    Tobacco comments:     no caffeine  "  Vaping Use    Vaping status: Never Used   Substance Use Topics    Alcohol use: Yes     Comment: \"social\"    Drug use: Never       Family History   Problem Relation Age of Onset    Depression Mother     Hypertension Mother     Arthritis Mother     Hypertension Father     Arthritis Sister     Asthma Brother     Depression Maternal Grandmother     Diabetes type II Maternal Grandmother     Hypertension Maternal Grandmother     Arthritis Maternal Grandmother     Diabetes type II Maternal Grandfather     Hypertension Paternal Grandmother     Alcohol abuse Paternal Grandfather     Heart disease Other         Health Maintenance Due   Topic Date Due    PAP SMEAR  Never done    COVID-19 Vaccine (3 - 2023-24 season) 09/01/2023        Current Outpatient Medications on File Prior to Visit   Medication Sig    Daily Probiotic Supplement 250 MG capsule Take 1 capsule by mouth Daily.    EnilloRing 0.12-0.015 MG/24HR vaginal ring Insert 1 each into the vagina Every 28 (Twenty-Eight) Days.    metoprolol succinate XL (TOPROL-XL) 25 MG 24 hr tablet TAKE 1 TABLET BY MOUTH EVERY MORNING    Multiple Vitamins-Minerals (Hair Skin & Nails) tablet Take 2 tablets by mouth Daily.    One-Daily Multi-Vitamin tablet tablet Take 1 tablet by mouth Daily.    [DISCONTINUED] hydrOXYzine (ATARAX) 10 MG tablet Take 1 tablet by mouth 3 (Three) Times a Day As Needed for Anxiety.    [DISCONTINUED] levothyroxine (SYNTHROID, LEVOTHROID) 75 MCG tablet TAKE 1 TABLET BY MOUTH EVERY MORNING     No current facility-administered medications on file prior to visit.       Immunization History   Administered Date(s) Administered    COVID-19 (MODERNA) Monovalent Original Booster 03/10/2021, 04/07/2021    Flu Vaccine Quad PF >36MO 10/27/2021    Fluzone (or Fluarix & Flulaval for VFC) >6mos 10/27/2021    Fluzone Quad >6mos (Multi-dose) 09/15/2020    Influenza, Unspecified 11/07/2019, 09/24/2020    Tdap 04/06/2022       Review of Systems   Constitutional:  Positive for " "fatigue. Negative for unexpected weight gain and unexpected weight loss.   HENT:  Negative for trouble swallowing.    Eyes:  Negative for blurred vision and double vision.   Respiratory:  Negative for shortness of breath.    Cardiovascular:  Positive for palpitations. Negative for chest pain.   Gastrointestinal:  Negative for abdominal pain and blood in stool.   Endocrine: Negative for polydipsia, polyphagia and polyuria.   Genitourinary:  Negative for dysuria and menstrual problem.   Neurological:  Negative for dizziness, seizures, syncope and light-headedness.   Psychiatric/Behavioral:  Negative for self-injury and suicidal ideas. The patient is nervous/anxious.         Objective     /74 (BP Location: Left arm, Patient Position: Sitting, Cuff Size: Adult)   Pulse 102   Temp 98 °F (36.7 °C) (Temporal)   Ht 175.3 cm (69\")   Wt 112 kg (248 lb)   SpO2 97%   BMI 36.62 kg/m²       Physical Exam  Vitals and nursing note reviewed.   Constitutional:       Appearance: Normal appearance. She is obese.      Comments: Wt stayed the same    HENT:      Head: Normocephalic.      Right Ear: External ear normal.      Left Ear: External ear normal.      Nose: Nose normal.      Mouth/Throat:      Mouth: Mucous membranes are moist.   Eyes:      Pupils: Pupils are equal, round, and reactive to light.   Cardiovascular:      Rate and Rhythm: Normal rate and regular rhythm.      Heart sounds: Normal heart sounds.   Pulmonary:      Effort: Pulmonary effort is normal.      Breath sounds: Normal breath sounds.   Abdominal:      Palpations: Abdomen is soft.      Comments: 44 in WC    Musculoskeletal:         General: Normal range of motion.      Cervical back: Normal range of motion and neck supple.   Skin:     General: Skin is warm and dry.   Neurological:      Mental Status: She is alert and oriented to person, place, and time.   Psychiatric:         Mood and Affect: Mood normal.         Behavior: Behavior normal.         " Thought Content: Thought content normal.         Judgment: Judgment normal.         Result Review :                           Assessment and Plan      Diagnoses and all orders for this visit:    1. Annual physical exam (Primary)  -     CBC & Differential  -     Comprehensive Metabolic Panel  -     Lipid Panel  -     TSH Rfx On Abnormal To Free T4  -     Urinalysis With Culture If Indicated - Urine, Clean Catch    2. Hypothyroidism due to Hashimoto's thyroiditis  -     levothyroxine (SYNTHROID, LEVOTHROID) 75 MCG tablet; Take 1 tablet by mouth Every Morning.  Dispense: 30 tablet; Refill: 11  -     TSH Rfx On Abnormal To Free T4    3. Situational anxiety  -     hydrOXYzine (ATARAX) 10 MG tablet; Take 1 tablet by mouth 3 (Three) Times a Day As Needed for Anxiety.  Dispense: 30 tablet; Refill: 5  -     CBC & Differential  -     Comprehensive Metabolic Panel  -     Urinalysis With Culture If Indicated - Urine, Clean Catch    4. Screening, lipid  -     Lipid Panel    5. Class 2 severe obesity with serious comorbidity and body mass index (BMI) of 36.0 to 36.9 in adult, unspecified obesity type  Comments:  D/W pt the diet and exercise and hole foods--and avoiding the processed foods    Pt will drop off the biometric forms or get them scanned in so I can print     Medication refill as noted above and labs as noted above        Follow Up     Return if symptoms worsen or fail to improve.    Patient was given instructions and counseling regarding her condition or for health maintenance advice. Please see specific information pulled into the AVS if appropriate.     Class 2 Severe Obesity (BMI >=35 and <=39.9). Obesity-related health conditions include the following:  hypothyroidism, palpitations  . Obesity is unchanged. BMI is is above average; BMI management plan is completed. We discussed portion control and increasing exercise.      Annie Oshea  reports that she has never smoked. She has never been exposed to tobacco smoke.  She has never used smokeless tobacco. I have educated her on the risk of diseases from using tobacco products such as cancer, COPD, and heart disease.

## 2024-05-20 ENCOUNTER — TELEPHONE (OUTPATIENT)
Dept: FAMILY MEDICINE CLINIC | Facility: CLINIC | Age: 39
End: 2024-05-20
Payer: COMMERCIAL

## 2024-05-20 DIAGNOSIS — E03.8 HYPOTHYROIDISM DUE TO HASHIMOTO'S THYROIDITIS: Primary | ICD-10-CM

## 2024-05-20 DIAGNOSIS — E06.3 HYPOTHYROIDISM DUE TO HASHIMOTO'S THYROIDITIS: Primary | ICD-10-CM

## 2024-05-20 NOTE — PROGRESS NOTES
Please mail letter to patient stating     Annie, comprehensive panel shows normal glucose and normal kidney and liver function test and normal electrolytes; cholesterol panel completely normal and blood counts normal and urinalysis normal and then regarding the thyroid levels the free T4 was normal range and the TSH level was modestly elevated and honestly at this point I would recommend increasing from 75 mcg daily on the levothyroxine to 88 mcg daily and then spot check in about 6 weeks-please let me know if you are okay with me doing this so that we can get that TSH level in good range    Also I did fill out the forms for you

## 2024-05-20 NOTE — TELEPHONE ENCOUNTER
Caller: Annie Oshea    Relationship: Self    Best call back number: [phone]     What is the best time to reach you: ANY    Who are you requesting to speak with (clinical staff, provider,  specific staff member): CLINICAL STAFF    What was the call regarding: PATIENT CALLED STATING THAT SHE HAS QUESTIONS REGARDING HER LAB RESULTS. SHE WOULD ALSO LIKE TO SPEAK TO A NURSE REGARDING THE CHANGES OF HER THYROID MEDICATIONS

## 2024-05-22 RX ORDER — LEVOTHYROXINE SODIUM 88 UG/1
88 TABLET ORAL DAILY
Qty: 30 TABLET | Refills: 1 | Status: SHIPPED | OUTPATIENT
Start: 2024-05-22

## 2024-05-22 NOTE — TELEPHONE ENCOUNTER
Caller: Annie Oshea    Relationship: Self    Best call back number: [phone]     What medication are you requesting: LEVOTHYROXINE 88 MCG      If a prescription is needed, what is your preferred pharmacy and phone number: [facility] [phone] [facility] [phone]      Additional notes:PATIENT IS OKAY WITH INCREASING MEDICATION TO 88 MCG -  BUT WOULD LIKE FOR PRESCRIPTION TO BE SENT TODAY  - DUE TO SHE GETS 'MED PACKS' AND THEY ARE FILLING THOSE FOR HER TODAY

## 2024-06-19 RX ORDER — METOPROLOL SUCCINATE 25 MG/1
25 TABLET, EXTENDED RELEASE ORAL EVERY MORNING
Qty: 30 TABLET | Refills: 0 | Status: SHIPPED | OUTPATIENT
Start: 2024-06-19

## 2024-06-19 NOTE — TELEPHONE ENCOUNTER
Caller: Annie Oshea    Relationship: Self    Best call back number: [phone]     Requested Prescriptions:   Requested Prescriptions     Pending Prescriptions Disp Refills    metoprolol succinate XL (TOPROL-XL) 25 MG 24 hr tablet 30 tablet 0     Sig: Take 1 tablet by mouth Every Morning.        Pharmacy where request should be sent: [address] [phone] [facility] [phone]      Last office visit with prescribing clinician: 5/18/2024   Last telemedicine visit with prescribing clinician: Visit date not found   Next office visit with prescribing clinician: Visit date not found     Additional details provided by patient: PATIENT STATES SHE IS IN BETWEEN CARDIOLOGISTS RIGHT NOW.     Does the patient have less than a 3 day supply:  [x] Yes  [] No    Bushra Khan Rep   06/19/24 12:15 EDT

## 2024-07-08 DIAGNOSIS — E06.3 HYPOTHYROIDISM DUE TO HASHIMOTO'S THYROIDITIS: ICD-10-CM

## 2024-07-08 DIAGNOSIS — E03.8 HYPOTHYROIDISM DUE TO HASHIMOTO'S THYROIDITIS: ICD-10-CM

## 2024-07-08 RX ORDER — LEVOTHYROXINE SODIUM 88 UG/1
88 TABLET ORAL EVERY MORNING
Qty: 30 TABLET | Refills: 1 | Status: SHIPPED | OUTPATIENT
Start: 2024-07-08

## 2024-07-12 ENCOUNTER — OFFICE VISIT (OUTPATIENT)
Dept: CARDIOLOGY | Facility: CLINIC | Age: 39
End: 2024-07-12
Payer: COMMERCIAL

## 2024-07-12 VITALS
HEART RATE: 82 BPM | WEIGHT: 248.8 LBS | BODY MASS INDEX: 36.85 KG/M2 | DIASTOLIC BLOOD PRESSURE: 82 MMHG | HEIGHT: 69 IN | SYSTOLIC BLOOD PRESSURE: 120 MMHG | OXYGEN SATURATION: 98 %

## 2024-07-12 DIAGNOSIS — Z82.49 FAMILY HISTORY OF EARLY CAD: ICD-10-CM

## 2024-07-12 DIAGNOSIS — R06.09 DOE (DYSPNEA ON EXERTION): ICD-10-CM

## 2024-07-12 DIAGNOSIS — I49.3 PVC (PREMATURE VENTRICULAR CONTRACTION): Primary | ICD-10-CM

## 2024-07-12 PROCEDURE — 99214 OFFICE O/P EST MOD 30 MIN: CPT | Performed by: INTERNAL MEDICINE

## 2024-07-12 PROCEDURE — 93000 ELECTROCARDIOGRAM COMPLETE: CPT | Performed by: INTERNAL MEDICINE

## 2024-07-12 NOTE — PROGRESS NOTES
CARDIOLOGY    Claudette Mak MD    ENCOUNTER DATE:  07/12/2024    Annie Oshea / 38 y.o. / female        CHIEF COMPLAINT / REASON FOR OFFICE VISIT     Palpitations and PVC      HISTORY OF PRESENT ILLNESS       HPI    Annie Oshea is a 38 y.o. female     This is a lady who saw Dr. Black back in 2022.  She was complaining of shortness of breath with exertion and palpitations as well as some elevated blood pressure numbers at that time.  He referred her for an echocardiogram 05/12/2022, which showed normal LV function, ejection fraction of 59% with normal diastolic function and no valvular heart disease.  She had a treadmill stress test in May 2022 and exercised for 6-1/2 minutes on the Ken protocol and achieved a target heart rate without acute EKG changes.  She followed up with Chen in August 2022 and was complaining of palpitations.  Her insurance would not cover a Zio patch so she had a 48-hour monitor placed, which showed some ventricular ectopy and short burst of SVT.  Complaints aligned with ventricular ectopy.  It was recommended that she start taking metoprolol due to some high blood pressure and the PVCs, but she was really nervous about it and did not start taking it.  She then saw Dr. Villagomez in 2023.  She had a Zio patch in January 2023, which showed palpitations without correlation and no significant arrhythmias.  She followed up with Dr. Villagomez in February 2023. He put her on Toprol XL.  She then saw her primary provider and in going through Dr. Villagomez's notes, her PCP was concerned that it was noted that she had a “cardiomyopathy”.    Her health has been pretty good up until this summer when she had some infectious type issues and was treated with antibiotics and steroids.  She had a lot of palpitations during that episode.  She started to feel better but then recently has felt like she has more shortness of breath and cough.          VITAL SIGNS     Visit Vitals  BP  "120/82 (BP Location: Left arm, Patient Position: Sitting, Cuff Size: Adult)   Pulse 82   Ht 175.3 cm (69\")   Wt 113 kg (248 lb 12.8 oz)   SpO2 98%   BMI 36.74 kg/m²         Wt Readings from Last 3 Encounters:   07/12/24 113 kg (248 lb 12.8 oz)   05/18/24 112 kg (248 lb)   03/25/24 112 kg (248 lb)     Body mass index is 36.74 kg/m².      PHYSICAL EXAMINATION     Constitutional:       General: Not in acute distress.  Neck:      Vascular: No carotid bruit or JVD.   Pulmonary:      Effort: Pulmonary effort is normal.      Breath sounds: Normal breath sounds.   Cardiovascular:      Normal rate. Regular rhythm.      Murmurs: There is no murmur.   Psychiatric:         Mood and Affect: Mood and affect normal.           REVIEWED DATA       ECG 12 Lead    Date/Time: 7/12/2024 1:12 PM  Performed by: Claudette Mak MD    Authorized by: Claudette Mak MD  Comparison: compared with previous ECG from 4/22/2022  Similar to previous ECG  Rhythm: sinus rhythm  BPM: 82  Conduction: conduction normal  ST Segments: ST segments normal  T Waves: T waves normal    Clinical impression: normal ECG            Lipid Panel          5/18/2024    09:31   Lipid Panel   Total Cholesterol 138    Triglycerides 91    HDL Cholesterol 58    VLDL Cholesterol 17    LDL Cholesterol  63    LDL/HDL Ratio 1.07        Lab Results   Component Value Date    GLUCOSE 83 05/18/2024    BUN 9 05/18/2024    CREATININE 0.55 (L) 05/18/2024    EGFR 120.5 05/18/2024    BCR 16.4 05/18/2024    K 4.5 05/18/2024    CO2 21.0 (L) 05/18/2024    CALCIUM 8.7 05/18/2024    ALBUMIN 4.0 05/18/2024    BILITOT 0.3 05/18/2024    AST 9 05/18/2024    ALT 8 05/18/2024       ASSESSMENT & PLAN      Diagnosis Plan   1. PVC (premature ventricular contraction)  Adult Transthoracic Echo Complete W/ Cont if Necessary Per Protocol      2. Family history of early CAD  Adult Transthoracic Echo Complete W/ Cont if Necessary Per Protocol      3. RUSS (dyspnea on exertion)  Adult Transthoracic " Echo Complete W/ Cont if Necessary Per Protocol          Symptomatic PVCs.  This was shown on a Holter monitor.  She is on Toprol XL.  I recommend she continue it.   Borderline hypertension.  Her blood pressure looks great on the Toprol XL and I recommend she continue it.  I also recommend she get more exercise as well as watch her diet and try to lose weight, and there is a chance she might be able to come off of the Toprol XL at some point.  There is also a chance that if her blood pressure goes higher, we may need to increase the Toprol XL, I explained this to her.      She does not have nor has she ever had a cardiomyopathy.  She had an echocardiogram in 2022 showing normal LV size and function.  I am going to repeat an echocardiogram just because she is having shortness of breath.     One of my nurses or I will go over the results of the echocardiogram after I have had a chance to review it.  Assuming everything is normal, I do not plan on doing any further testing.          Orders Placed This Encounter   Procedures    ECG 12 Lead     This order was created via procedure documentation     Order Specific Question:   Release to patient     Answer:   Routine Release [[id number]]    Adult Transthoracic Echo Complete W/ Cont if Necessary Per Protocol     Standing Status:   Future     Standing Expiration Date:   7/12/2025     Order Specific Question:   Reason for exam?     Answer:   Palpitations     Order Specific Question:   Reason for exam?     Answer:   Dyspnea     Order Specific Question:   Release to patient     Answer:   Routine Release [[id number]]           MEDICATIONS         Discharge Medications            Accurate as of July 12, 2024  1:14 PM. If you have any questions, ask your nurse or doctor.                Continue These Medications        Instructions Start Date   Daily Probiotic Supplement 250 MG capsule  Generic drug: saccharomyces boulardii   1 capsule, Oral, Daily      EnilloRing 0.12-0.015  MG/24HR vaginal ring  Generic drug: etonogestrel-ethinyl estradiol   1 each, Vaginal, Every 28 Days      Hair Skin & Nails tablet   2 tablets, Oral, Daily      hydrOXYzine 10 MG tablet  Commonly known as: ATARAX   10 mg, Oral, 3 Times Daily PRN      levothyroxine 88 MCG tablet  Commonly known as: SYNTHROID, LEVOTHROID   88 mcg, Oral, Every Morning      metoprolol succinate XL 25 MG 24 hr tablet  Commonly known as: TOPROL-XL   25 mg, Oral, Every Morning      One-Daily Multi-Vitamin tablet tablet  Generic drug: multivitamin   1 tablet, Oral, Daily                 Claudette Mak MD  07/12/24  13:14 EDT    Part of this note may be an electronic transcription/translation of spoken language to printed text using the Dragon dictation system.

## 2024-07-13 RX ORDER — METOPROLOL SUCCINATE 25 MG/1
25 TABLET, EXTENDED RELEASE ORAL EVERY MORNING
Qty: 30 TABLET | Refills: 0 | Status: SHIPPED | OUTPATIENT
Start: 2024-07-13

## 2024-07-19 ENCOUNTER — TELEPHONE (OUTPATIENT)
Dept: CARDIOLOGY | Facility: CLINIC | Age: 39
End: 2024-07-19
Payer: COMMERCIAL

## 2024-07-19 ENCOUNTER — HOSPITAL ENCOUNTER (OUTPATIENT)
Dept: CARDIOLOGY | Facility: HOSPITAL | Age: 39
Discharge: HOME | End: 2024-07-19
Admitting: INTERNAL MEDICINE
Payer: COMMERCIAL

## 2024-07-19 VITALS
BODY MASS INDEX: 36.73 KG/M2 | HEIGHT: 69 IN | WEIGHT: 248 LBS | SYSTOLIC BLOOD PRESSURE: 128 MMHG | DIASTOLIC BLOOD PRESSURE: 80 MMHG | HEART RATE: 78 BPM

## 2024-07-19 DIAGNOSIS — I49.3 PVC (PREMATURE VENTRICULAR CONTRACTION): ICD-10-CM

## 2024-07-19 DIAGNOSIS — R06.09 DOE (DYSPNEA ON EXERTION): ICD-10-CM

## 2024-07-19 DIAGNOSIS — Z82.49 FAMILY HISTORY OF EARLY CAD: ICD-10-CM

## 2024-07-19 LAB
AORTIC ARCH: 2.6 CM
AORTIC DIMENSIONLESS INDEX: 0.7 (DI)
ASCENDING AORTA: 2.5 CM
BH CV ECHO MEAS - ACS: 2.38 CM
BH CV ECHO MEAS - AO MAX PG: 7.1 MMHG
BH CV ECHO MEAS - AO MEAN PG: 4 MMHG
BH CV ECHO MEAS - AO ROOT DIAM: 3.4 CM
BH CV ECHO MEAS - AO V2 MAX: 133 CM/SEC
BH CV ECHO MEAS - AO V2 VTI: 28.1 CM
BH CV ECHO MEAS - AVA(I,D): 2.34 CM2
BH CV ECHO MEAS - EDV(CUBED): 110.6 ML
BH CV ECHO MEAS - EDV(MOD-SP2): 155 ML
BH CV ECHO MEAS - EDV(MOD-SP4): 106 ML
BH CV ECHO MEAS - EF(MOD-BP): 55.7 %
BH CV ECHO MEAS - EF(MOD-SP2): 57.4 %
BH CV ECHO MEAS - EF(MOD-SP4): 55.7 %
BH CV ECHO MEAS - ESV(CUBED): 32.2 ML
BH CV ECHO MEAS - ESV(MOD-SP2): 66 ML
BH CV ECHO MEAS - ESV(MOD-SP4): 47 ML
BH CV ECHO MEAS - FS: 33.7 %
BH CV ECHO MEAS - IVS/LVPW: 0.89 CM
BH CV ECHO MEAS - IVSD: 0.8 CM
BH CV ECHO MEAS - LAT PEAK E' VEL: 8.5 CM/SEC
BH CV ECHO MEAS - LV DIASTOLIC VOL/BSA (35-75): 46.3 CM2
BH CV ECHO MEAS - LV MASS(C)D: 137.1 GRAMS
BH CV ECHO MEAS - LV MAX PG: 4.4 MMHG
BH CV ECHO MEAS - LV MEAN PG: 2 MMHG
BH CV ECHO MEAS - LV SYSTOLIC VOL/BSA (12-30): 20.5 CM2
BH CV ECHO MEAS - LV V1 MAX: 105 CM/SEC
BH CV ECHO MEAS - LV V1 VTI: 19.1 CM
BH CV ECHO MEAS - LVIDD: 4.8 CM
BH CV ECHO MEAS - LVIDS: 3.2 CM
BH CV ECHO MEAS - LVOT AREA: 3.4 CM2
BH CV ECHO MEAS - LVOT DIAM: 2.09 CM
BH CV ECHO MEAS - LVPWD: 0.9 CM
BH CV ECHO MEAS - MED PEAK E' VEL: 9.8 CM/SEC
BH CV ECHO MEAS - MR MAX PG: 12.1 MMHG
BH CV ECHO MEAS - MR MAX VEL: 173.7 CM/SEC
BH CV ECHO MEAS - MV A DUR: 0.11 SEC
BH CV ECHO MEAS - MV A MAX VEL: 62.1 CM/SEC
BH CV ECHO MEAS - MV DEC SLOPE: 367.1 CM/SEC2
BH CV ECHO MEAS - MV DEC TIME: 0.19 SEC
BH CV ECHO MEAS - MV E MAX VEL: 91.3 CM/SEC
BH CV ECHO MEAS - MV E/A: 1.47
BH CV ECHO MEAS - MV MAX PG: 4 MMHG
BH CV ECHO MEAS - MV MEAN PG: 2.18 MMHG
BH CV ECHO MEAS - MV P1/2T: 73.6 MSEC
BH CV ECHO MEAS - MV V2 VTI: 22 CM
BH CV ECHO MEAS - MVA(P1/2T): 3 CM2
BH CV ECHO MEAS - MVA(VTI): 3 CM2
BH CV ECHO MEAS - PA ACC TIME: 0.12 SEC
BH CV ECHO MEAS - PA V2 MAX: 103.2 CM/SEC
BH CV ECHO MEAS - PULM A REVS DUR: 0.16 SEC
BH CV ECHO MEAS - PULM A REVS VEL: 29.1 CM/SEC
BH CV ECHO MEAS - PULM DIAS VEL: 47.6 CM/SEC
BH CV ECHO MEAS - PULM S/D: 1.05
BH CV ECHO MEAS - PULM SYS VEL: 50.1 CM/SEC
BH CV ECHO MEAS - QP/QS: 0.42
BH CV ECHO MEAS - RAP SYSTOLE: 3 MMHG
BH CV ECHO MEAS - RV MAX PG: 1.5 MMHG
BH CV ECHO MEAS - RV V1 MAX: 61.3 CM/SEC
BH CV ECHO MEAS - RV V1 VTI: 11.8 CM
BH CV ECHO MEAS - RVOT DIAM: 1.74 CM
BH CV ECHO MEAS - RVSP: 15.8 MMHG
BH CV ECHO MEAS - SUP REN AO DIAM: 2.3 CM
BH CV ECHO MEAS - SV(LVOT): 65.8 ML
BH CV ECHO MEAS - SV(MOD-SP2): 89 ML
BH CV ECHO MEAS - SV(MOD-SP4): 59 ML
BH CV ECHO MEAS - SV(RVOT): 27.9 ML
BH CV ECHO MEAS - SVI(LVOT): 28.8 ML/M2
BH CV ECHO MEAS - SVI(MOD-SP2): 38.9 ML/M2
BH CV ECHO MEAS - SVI(MOD-SP4): 25.8 ML/M2
BH CV ECHO MEAS - TAPSE (>1.6): 2.07 CM
BH CV ECHO MEAS - TR MAX PG: 12.8 MMHG
BH CV ECHO MEAS - TR MAX VEL: 179.2 CM/SEC
BH CV ECHO MEASUREMENTS AVERAGE E/E' RATIO: 9.98
BH CV XLRA - RV BASE: 3.1 CM
BH CV XLRA - RV LENGTH: 7.5 CM
BH CV XLRA - RV MID: 2.9 CM
BH CV XLRA - TDI S': 16.8 CM/SEC
LEFT ATRIUM VOLUME INDEX: 19.7 ML/M2
SINUS: 2.9 CM
STJ: 2.6 CM

## 2024-07-19 PROCEDURE — 93306 TTE W/DOPPLER COMPLETE: CPT

## 2024-07-22 NOTE — TELEPHONE ENCOUNTER
Attempted to call Annie Oshea, no answer.  Left a voicemail for patient to call back and ask to speak with the triage nurses.  Will continue to try to reach patient.    Angelic Gardner RN  [facility] Triage  07/22/24 09:02 EDT

## 2024-07-22 NOTE — TELEPHONE ENCOUNTER
Notified Annie Oshea of results, patient verbalizes understanding.    Angelic Gardner RN  [facility] Triage  07/22/24 09:09 EDT

## 2024-08-08 RX ORDER — METOPROLOL SUCCINATE 25 MG/1
25 TABLET, EXTENDED RELEASE ORAL EVERY MORNING
Qty: 30 TABLET | Refills: 0 | Status: SHIPPED | OUTPATIENT
Start: 2024-08-08

## 2024-08-29 ENCOUNTER — PATIENT MESSAGE (OUTPATIENT)
Dept: FAMILY MEDICINE CLINIC | Facility: CLINIC | Age: 39
End: 2024-08-29
Payer: COMMERCIAL

## 2024-08-29 DIAGNOSIS — E06.3 HYPOTHYROIDISM DUE TO HASHIMOTO'S THYROIDITIS: ICD-10-CM

## 2024-08-29 DIAGNOSIS — E03.8 HYPOTHYROIDISM DUE TO HASHIMOTO'S THYROIDITIS: ICD-10-CM

## 2024-08-29 RX ORDER — LEVOTHYROXINE SODIUM 88 UG/1
88 TABLET ORAL EVERY MORNING
Qty: 30 TABLET | Refills: 0 | Status: SHIPPED | OUTPATIENT
Start: 2024-08-29

## 2024-08-29 NOTE — TELEPHONE ENCOUNTER
From: Anum SOTO  To: Annie Oshea  Sent: 8/29/2024 8:37 AM EDT  Subject: Thyroid Medication    Nitza Valencia received a refill request on your thyroid medication, but it looks like she has ordered a thyroid test to be rechecked. Please stop in the office at your soonest convenience to get this lab drawn. You do not need an appointment for this, you also do not have to be fasting.     Thanks

## 2024-09-05 ENCOUNTER — E-VISIT (OUTPATIENT)
Dept: FAMILY MEDICINE CLINIC | Facility: TELEHEALTH | Age: 39
End: 2024-09-05

## 2024-09-05 ENCOUNTER — TELEPHONE (OUTPATIENT)
Dept: FAMILY MEDICINE CLINIC | Facility: CLINIC | Age: 39
End: 2024-09-05

## 2024-09-05 RX ORDER — FLUCONAZOLE 150 MG/1
150 TABLET ORAL ONCE
Start: 2024-09-05 | End: 2024-09-05

## 2024-09-05 NOTE — TELEPHONE ENCOUNTER
Caller: Annie Oshea    Relationship: Self    Best call back number: [phone]     What is the best time to reach you: ANYTIME     Who are you requesting to speak with (clinical staff, provider,  specific staff member): CLINICAL     What was the call regarding: PATIENT IS CALLING REQUESTING A MEDICATION FOR POSSIBLE DEVELOPING OF YEAST INFECTION, HUB DID ADVISED OF POSSIBLE TO MAKE APPOINTMENT.

## 2024-09-05 NOTE — E-VISIT TREATED
Date: 2024 14:24:08  Clinician: Linda Eden  Clinician NPI: [id number]  Patient: Annie Oshea  Patient : 1985  Patient Address: [address] [address], [place] [address]  Patient Phone: [phone]  Visit Protocol: Yeast infection  Patient Summary:  Annie is a 39 year old ( : 1985 ) female who initiated a visit for a presumed vaginal yeast infection.     Annie began noticing vaginal pruritus, vaginal burning, and vaginal irritation 1-3 days ago.   Annie denies having   vaginal discharge, blisters, abdominal pain, and open sores. Annie also denies feeling feverish.   Annie has not tried to treat current symptoms with any medication.   Precipitating events  Annie denies having a sexually transmitted disease.   Pertinent   medical history  Annie has a history of vaginal yeast infections. Annie has had one (1) occurrence in the past year and the current symptoms are similar to previous yeast infections. Annie is unsure whether fluconazole (Diflucan) was used to treat   previous yeast infections.   Annie denies taking antibiotics in the past 2 weeks. Preferred medication: Pill   Annie has not had bacterial vaginosis in the past.   Annie does not have diabetes.   Annie denies having immunosuppressive conditions (e.g.,   chemotherapy, HIV, organ transplant, long-term use of steroids or other immunosuppressive medications, splenectomy).   Annie denies pregnancy and denies breastfeeding.   Additional information as reported by the patient (free text): I may have been   allergic to one of the antibiotics I was on for pneumonia in /July. We (ER doctor and I) were not sure but I broke out in a small warm-to-the-touch rash on my arm and was on steroids at the same time. It was weird.      MEDICATIONS: levothyroxine oral, hydroxyzine HCl oral, etonogestrel-ethinyl estradiol vaginal, metoprolol succinate oral, ALLERGIES: NKDA  Clinician Response:  Dear Annie,  Based on the information you have provided, you  have a vaginal yeast infection which is a common infection of the vagina caused by a fungus. Yeast are a type of fungus.  The most common symptom of a yeast infection is   itching in and around the vagina. Other signs and symptoms include burning, redness of the vulva (the outer part of the female genitals), or a thick, white vaginal discharge that looks like cottage cheese and does not have a bad smell.  Medication   information  I am prescribing:     Fluconazole (Diflucan) 150 mg oral tablet. Take 1 tablet by mouth in a single dose. There are no refills with this prescription.   Self care  Steps you can take to prevent symptoms of future vaginal yeast infection:     Avoid irritants such as scented bath products, tampons, pads, or vaginal sprays and powders    Avoid douching    Wear cotton underwear and if you are comfortable doing so, do not wear underwear to bed    Avoid hot tubs and whirlpool spas     When to seek care  Most women notice improvement in their symptoms within 1-2 days after starting treatment with complete clearing in 5-7 days.  Please make an appointment to be seen in a clinic or urgent care if any of the following occur:     Your symptoms have not improved in 3 days or not resolved in 10 days    You develop new symptoms or your symptoms become worse    If you think you may be at risk for an STD      Diagnosis: Candida vulvovaginitis  Diagnosis ICD: B37.31    Follow up instructions: ATTENTION: If you have been prescribed medications, your prescriptions will not be sent until you choose your pharmacy.  To do so open the link within your notification, or go to Be[employer] and click eVisit in the menu to open your   treatment plan. From there, you can select your pharmacy at the bottom of your after visit summary. You can also go to [email or web address]  Prescriptions  Prescription: fluconazole (Diflucan) 150 mg oral tablet, take 1 tablet by mouth in a single dose  Sent  To[facility] - [phone] - [address] [id number]

## 2024-09-14 RX ORDER — METOPROLOL SUCCINATE 25 MG/1
25 TABLET, EXTENDED RELEASE ORAL EVERY MORNING
Qty: 30 TABLET | Refills: 1 | Status: SHIPPED | OUTPATIENT
Start: 2024-09-14

## 2024-10-04 DIAGNOSIS — E06.3 HYPOTHYROIDISM DUE TO HASHIMOTO'S THYROIDITIS: ICD-10-CM

## 2024-10-05 RX ORDER — LEVOTHYROXINE SODIUM 88 UG/1
88 TABLET ORAL EVERY MORNING
Qty: 30 TABLET | Refills: 0 | Status: SHIPPED | OUTPATIENT
Start: 2024-10-05

## 2024-10-05 NOTE — TELEPHONE ENCOUNTER
Message sent to pharmacy:  PLEASE REMIND PT TO STOP IN AND OBTAIN THE REPEATED TSH LAB THAT WAS ORDERED MONTHS AGO

## 2024-10-25 RX ORDER — METOPROLOL SUCCINATE 25 MG/1
25 TABLET, EXTENDED RELEASE ORAL EVERY MORNING
Qty: 30 TABLET | Refills: 0 | Status: SHIPPED | OUTPATIENT
Start: 2024-10-25

## 2024-11-01 DIAGNOSIS — E06.3 HYPOTHYROIDISM DUE TO HASHIMOTO'S THYROIDITIS: ICD-10-CM

## 2024-11-02 RX ORDER — LEVOTHYROXINE SODIUM 88 UG/1
88 TABLET ORAL EVERY MORNING
Qty: 30 TABLET | Refills: 0 | Status: SHIPPED | OUTPATIENT
Start: 2024-11-02

## 2024-11-19 ENCOUNTER — OFFICE VISIT (OUTPATIENT)
Dept: FAMILY MEDICINE CLINIC | Facility: CLINIC | Age: 39
End: 2024-11-19
Payer: COMMERCIAL

## 2024-11-19 VITALS
TEMPERATURE: 97.5 F | HEIGHT: 69 IN | SYSTOLIC BLOOD PRESSURE: 126 MMHG | HEART RATE: 114 BPM | DIASTOLIC BLOOD PRESSURE: 76 MMHG | BODY MASS INDEX: 35.55 KG/M2 | WEIGHT: 240 LBS | OXYGEN SATURATION: 97 %

## 2024-11-19 DIAGNOSIS — F41.8 SITUATIONAL ANXIETY: ICD-10-CM

## 2024-11-19 DIAGNOSIS — E06.3 HYPOTHYROIDISM DUE TO HASHIMOTO'S THYROIDITIS: Primary | ICD-10-CM

## 2024-11-19 DIAGNOSIS — Z30.44 ENCOUNTER FOR SURVEILLANCE OF VAGINAL RING HORMONAL CONTRACEPTIVE DEVICE: ICD-10-CM

## 2024-11-19 LAB
ALBUMIN SERPL-MCNC: 3.9 G/DL (ref 3.5–5.2)
ALBUMIN/GLOB SERPL: 1.4 G/DL
ALP SERPL-CCNC: 67 U/L (ref 39–117)
ALT SERPL W P-5'-P-CCNC: 11 U/L (ref 1–33)
ANION GAP SERPL CALCULATED.3IONS-SCNC: 11 MMOL/L (ref 5–15)
AST SERPL-CCNC: 13 U/L (ref 1–32)
BILIRUB SERPL-MCNC: 0.3 MG/DL (ref 0–1.2)
BUN SERPL-MCNC: 10 MG/DL (ref 6–20)
BUN/CREAT SERPL: 14.5 (ref 7–25)
CALCIUM SPEC-SCNC: 8.9 MG/DL (ref 8.6–10.5)
CHLORIDE SERPL-SCNC: 108 MMOL/L (ref 98–107)
CO2 SERPL-SCNC: 23 MMOL/L (ref 22–29)
CREAT SERPL-MCNC: 0.69 MG/DL (ref 0.57–1)
EGFRCR SERPLBLD CKD-EPI 2021: 113.4 ML/MIN/1.73
GLOBULIN UR ELPH-MCNC: 2.8 GM/DL
GLUCOSE SERPL-MCNC: 97 MG/DL (ref 65–99)
POTASSIUM SERPL-SCNC: 4.1 MMOL/L (ref 3.5–5.2)
PROT SERPL-MCNC: 6.7 G/DL (ref 6–8.5)
SODIUM SERPL-SCNC: 142 MMOL/L (ref 136–145)
T4 FREE SERPL-MCNC: 1.46 NG/DL (ref 0.92–1.68)
TSH SERPL DL<=0.05 MIU/L-ACNC: 2.55 UIU/ML (ref 0.27–4.2)

## 2024-11-19 PROCEDURE — 99214 OFFICE O/P EST MOD 30 MIN: CPT | Performed by: NURSE PRACTITIONER

## 2024-11-19 PROCEDURE — 84439 ASSAY OF FREE THYROXINE: CPT | Performed by: NURSE PRACTITIONER

## 2024-11-19 PROCEDURE — 36415 COLL VENOUS BLD VENIPUNCTURE: CPT | Performed by: NURSE PRACTITIONER

## 2024-11-19 PROCEDURE — 84443 ASSAY THYROID STIM HORMONE: CPT | Performed by: NURSE PRACTITIONER

## 2024-11-19 PROCEDURE — 80053 COMPREHEN METABOLIC PANEL: CPT | Performed by: NURSE PRACTITIONER

## 2024-11-19 RX ORDER — HYDROXYZINE HYDROCHLORIDE 10 MG/1
10 TABLET, FILM COATED ORAL 3 TIMES DAILY PRN
Qty: 30 TABLET | Refills: 5 | Status: SHIPPED | OUTPATIENT
Start: 2024-11-19

## 2024-11-19 RX ORDER — LEVOTHYROXINE SODIUM 88 UG/1
88 TABLET ORAL EVERY MORNING
Qty: 30 TABLET | Refills: 5 | Status: SHIPPED | OUTPATIENT
Start: 2024-11-19

## 2024-11-19 RX ORDER — ETONOGESTREL AND ETHINYL ESTRADIOL VAGINAL .015; .12 MG/D; MG/D
1 RING VAGINAL
Qty: 1 EACH | Refills: 5 | Status: SHIPPED | OUTPATIENT
Start: 2024-11-19

## 2024-11-19 NOTE — PROGRESS NOTES
..  Venipuncture Blood Specimen Collection  Venipuncture performed in Lt arm by Janna Bell with good hemostasis. Patient tolerated the procedure well without complications.   11/19/24   Kadie Tejada MA

## 2024-11-19 NOTE — PROGRESS NOTES
Chief Complaint  Hypothyroidism (Follow up and labs) and Contraception (Pt is wanting to get prescription refilled here)    Subjective            Annie Oshea presents to [facility]  History of Present Illness  Patient is here today for medication refills on the chronic comorbid conditions managed from the primary care standpoint and to follow-up on her labs    --Hypothyroidism: Tolerating medication well with no side effects no issues reported and patient is starting to lose some weight and no hair or nail skin issues reported overall stable and we will obtain labs today    --Situational anxiety: Tolerating medication well with no side effects no issues reported overall receiving good efficacy and stable and denies all SI/HI    --Patient also reports that she is desiring a refill on her EnilloRing for contraception but it does appear in the system and she is actually due for a Pap smear and apparently in the past was seeing GYN Dr. Jalen Melendez --LMP: 11/9/24 and patient reports that her last Pap smear was on 11/30/2023 everything was normal she not having any issues she is having regular periods like clockwork no signs of side effects and patient is not a smoker and denies any chance of pregnancy    And then patient reports that she was seen by dermatology and then [facility] dept. and they did the removal of the lesion/mass that was on that left side of the forehead just above the eyebrow and they also had done a full body screening everything was good and she never did call back or receive any reports on the biopsy done she will need to call and do that-but she does report that she believes that at the [facility] they did offer the flu shot and that she already got that she will call and make sure certain otherwise that she did not show to stop by and get that flu shot done    And then also approximate a month ago she was washing her hair and  "noticed a small little scabbed or pimple-like lesion or area to the upper right scalp area that she would like looked at as well      PHQ-2 Total Score:    PHQ-9 Total Score:      Past Medical History:   Diagnosis Date    Anxiety     Cardiomyopathy     PER DR JIMENEZ OFFICE NOTE 4/22/22-DD    Chiari I malformation     Depression     Gestational diabetes     Hashimoto's disease     Hypothyroid     Kidney stones     Palpitations     PER DR JIMENEZ OFFICE NOTE 4/22/22-DD    Pre-eclampsia     PVC (premature ventricular contraction)     Sinus tachycardia        No Known Allergies     Past Surgical History:   Procedure Laterality Date    KIDNEY STONE SURGERY      MOLE REMOVAL      WISDOM TOOTH EXTRACTION          Social History     Tobacco Use    Smoking status: Never     Passive exposure: Never    Smokeless tobacco: Never    Tobacco comments:     no caffeine   Vaping Use    Vaping status: Never Used   Substance Use Topics    Alcohol use: Yes     Comment: \"social\"    Drug use: Never       Family History   Problem Relation Age of Onset    Depression Mother     Hypertension Mother     Arthritis Mother     Hypertension Father     Arthritis Sister     Asthma Brother     Depression Maternal Grandmother     Diabetes type II Maternal Grandmother     Hypertension Maternal Grandmother     Arthritis Maternal Grandmother     Diabetes type II Maternal Grandfather     Hypertension Paternal Grandmother     Alcohol abuse Paternal Grandfather     Heart disease Other         Health Maintenance Due   Topic Date Due    INFLUENZA VACCINE  08/01/2024        Current Outpatient Medications on File Prior to Visit   Medication Sig    Daily Probiotic Supplement 250 MG capsule Take 1 capsule by mouth Daily.    metoprolol succinate XL (TOPROL-XL) 25 MG 24 hr tablet TAKE 1 TABLET BY MOUTH EVERY MORNING    Multiple Vitamins-Minerals (Hair Skin & Nails) tablet Take 2 tablets by mouth Daily.    One-Daily Multi-Vitamin tablet tablet Take 1 tablet by " mouth Daily.    [DISCONTINUED] EnilloRing 0.12-0.015 MG/24HR vaginal ring Insert 1 each into the vagina Every 28 (Twenty-Eight) Days.    [DISCONTINUED] hydrOXYzine (ATARAX) 10 MG tablet Take 1 tablet by mouth 3 (Three) Times a Day As Needed for Anxiety.    [DISCONTINUED] levothyroxine (SYNTHROID, LEVOTHROID) 88 MCG tablet TAKE 1 TABLET BY MOUTH EVERY MORNING     No current facility-administered medications on file prior to visit.       Immunization History   Administered Date(s) Administered    COVID-19 (MODERNA) Monovalent Original Booster 03/10/2021, 04/07/2021    Flu Vaccine Quad PF >36MO 10/27/2021    Fluzone (or Fluarix & Flulaval for VFC) >6mos 10/27/2021    Fluzone Quad >6mos (Multi-dose) 09/15/2020    Influenza, Unspecified 11/07/2019, 09/24/2020    Tdap 04/06/2022       Review of Systems   Constitutional:  Negative for fatigue.   HENT:  Negative for trouble swallowing.    Eyes:  Negative for double vision.   Respiratory:  Negative for shortness of breath.    Cardiovascular:  Negative for chest pain.   Gastrointestinal:  Negative for abdominal pain and blood in stool.   Endocrine: Negative for polydipsia, polyphagia and polyuria.   Genitourinary:  Negative for dysuria, menstrual problem, pelvic pain, pelvic pressure, vaginal bleeding, vaginal discharge and vaginal pain.   Musculoskeletal:  Positive for back pain. Negative for neck pain.   Skin:         In the spring saw dermatology Roland--and they did a full body check and then then they did refer to plastics and they did the removal of the lump on the left side forehead area--and needs a spot checked on the scalp and though twas a scab and now thinks may be something else--approx 1 month ago noticed this    Neurological:  Negative for dizziness, seizures, syncope and light-headedness.   Hematological:  Does not bruise/bleed easily.   Psychiatric/Behavioral:  Negative for self-injury and suicidal ideas.         Objective     /76 (BP Location: Left  "arm)   Pulse 114   Temp 97.5 °F (36.4 °C) (Temporal)   Ht 175.3 cm (69\")   Wt 109 kg (240 lb)   SpO2 97%   BMI 35.44 kg/m²       Physical Exam  Vitals and nursing note reviewed.   Constitutional:       Appearance: Normal appearance.   HENT:      Head: Normocephalic.      Right Ear: External ear normal.      Left Ear: External ear normal.      Nose: Nose normal.      Mouth/Throat:      Mouth: Mucous membranes are moist.   Eyes:      Pupils: Pupils are equal, round, and reactive to light.   Cardiovascular:      Rate and Rhythm: Normal rate and regular rhythm.      Heart sounds: Normal heart sounds.   Pulmonary:      Effort: Pulmonary effort is normal.      Breath sounds: Normal breath sounds.   Abdominal:      Palpations: Abdomen is soft.   Musculoskeletal:         General: Normal range of motion.      Cervical back: Normal range of motion and neck supple.   Skin:     General: Skin is warm and dry.      Comments: To the right side of the upper mid scalp --small area approx 4-5 mm sized raised red lesion--consistent with an hemangioma    Neurological:      Mental Status: She is alert and oriented to person, place, and time.   Psychiatric:         Mood and Affect: Mood normal.         Behavior: Behavior normal.         Thought Content: Thought content normal.         Judgment: Judgment normal.         Result Review :     The following data was reviewed by: JOSE A Phillips on 11/19/2024:                 Office Visit with Claudette Mak MD (07/12/2024)      Assessment and Plan      Diagnoses and all orders for this visit:    1. Hypothyroidism due to Hashimoto's thyroiditis (Primary)  -     levothyroxine (SYNTHROID, LEVOTHROID) 88 MCG tablet; Take 1 tablet by mouth Every Morning.  Dispense: 30 tablet; Refill: 5  -     TSH+Free T4  -     Comprehensive Metabolic Panel    2. Situational anxiety  -     hydrOXYzine (ATARAX) 10 MG tablet; Take 1 tablet by mouth 3 (Three) Times a Day As Needed for Anxiety.  " Dispense: 30 tablet; Refill: 5  -     TSH+Free T4  -     Comprehensive Metabolic Panel    3. Encounter for surveillance of vaginal ring hormonal contraceptive device  -     EnilloRing 0.12-0.015 MG/24HR vaginal ring; Insert 1 each into the vagina Every 28 (Twenty-Eight) Days.  Dispense: 1 each; Refill: 5  -     Comprehensive Metabolic Panel    Medications refilled as noted above and then when patient follows up next year we will do the well woman Pap smear and we are obtaining labs today regarding the hypothyroidism and then with regards to the scalp lesion patient will monitor if she sees any changes with that she can always follow-up with the dermatologist otherwise Appears like a benign hemangioma        Follow Up     Return if symptoms worsen or fail to improve, for Annual physical, fasting labs and refills, Recheck.    Patient was given instructions and counseling regarding her condition or for health maintenance advice. Please see specific information pulled into the AVS if appropriate.            Annie Oshea  reports that she has never smoked. She has never been exposed to tobacco smoke. She has never used smokeless tobacco. I have educated her on the risk of diseases from using tobacco products such as cancer, COPD, and heart disease.

## 2024-11-19 NOTE — PROGRESS NOTES
Please mail letter to patient stating    Annie, comprehensive panel shows normal glucose and normal kidney and liver function test and normal electrolytes and the thyroid levels were all normal range

## 2024-12-12 RX ORDER — METOPROLOL SUCCINATE 25 MG/1
25 TABLET, EXTENDED RELEASE ORAL EVERY MORNING
Qty: 30 TABLET | Refills: 0 | Status: SHIPPED | OUTPATIENT
Start: 2024-12-12

## 2025-01-07 RX ORDER — METOPROLOL SUCCINATE 25 MG/1
25 TABLET, EXTENDED RELEASE ORAL EVERY MORNING
Qty: 30 TABLET | Refills: 0 | Status: SHIPPED | OUTPATIENT
Start: 2025-01-07

## 2025-02-03 RX ORDER — METOPROLOL SUCCINATE 25 MG/1
25 TABLET, EXTENDED RELEASE ORAL EVERY MORNING
Qty: 30 TABLET | Refills: 0 | Status: SHIPPED | OUTPATIENT
Start: 2025-02-03

## 2025-03-04 RX ORDER — METOPROLOL SUCCINATE 25 MG/1
25 TABLET, EXTENDED RELEASE ORAL EVERY MORNING
Qty: 30 TABLET | Refills: 2 | Status: SHIPPED | OUTPATIENT
Start: 2025-03-04

## 2025-03-04 NOTE — TELEPHONE ENCOUNTER
3-month courtesy refill And then will be due for her every 6 month follow-up visit and labs please

## 2025-05-28 DIAGNOSIS — E06.3 HYPOTHYROIDISM DUE TO HASHIMOTO'S THYROIDITIS: ICD-10-CM

## 2025-05-28 DIAGNOSIS — Z30.44 ENCOUNTER FOR SURVEILLANCE OF VAGINAL RING HORMONAL CONTRACEPTIVE DEVICE: ICD-10-CM

## 2025-05-28 RX ORDER — METOPROLOL SUCCINATE 25 MG/1
25 TABLET, EXTENDED RELEASE ORAL EVERY MORNING
Qty: 30 TABLET | Refills: 0 | Status: SHIPPED | OUTPATIENT
Start: 2025-05-28

## 2025-05-28 RX ORDER — ETONOGESTREL AND ETHINYL ESTRADIOL VAGINAL .015; .12 MG/D; MG/D
RING VAGINAL
Qty: 1 EACH | Refills: 0 | Status: SHIPPED | OUTPATIENT
Start: 2025-05-28

## 2025-05-28 RX ORDER — LEVOTHYROXINE SODIUM 88 UG/1
88 TABLET ORAL EVERY MORNING
Qty: 30 TABLET | Refills: 0 | Status: SHIPPED | OUTPATIENT
Start: 2025-05-28

## 2025-06-24 ENCOUNTER — OFFICE VISIT (OUTPATIENT)
Dept: FAMILY MEDICINE CLINIC | Facility: CLINIC | Age: 40
End: 2025-06-24
Payer: COMMERCIAL

## 2025-06-24 VITALS
HEIGHT: 69 IN | DIASTOLIC BLOOD PRESSURE: 60 MMHG | BODY MASS INDEX: 36.88 KG/M2 | HEART RATE: 104 BPM | TEMPERATURE: 97 F | OXYGEN SATURATION: 98 % | WEIGHT: 249 LBS | SYSTOLIC BLOOD PRESSURE: 116 MMHG

## 2025-06-24 DIAGNOSIS — I99.8 FLUCTUATING BLOOD PRESSURE: ICD-10-CM

## 2025-06-24 DIAGNOSIS — N89.8 VAGINAL DISCHARGE: ICD-10-CM

## 2025-06-24 DIAGNOSIS — F41.8 SITUATIONAL ANXIETY: ICD-10-CM

## 2025-06-24 DIAGNOSIS — E06.3 HYPOTHYROIDISM DUE TO HASHIMOTO'S THYROIDITIS: ICD-10-CM

## 2025-06-24 DIAGNOSIS — Z01.419 WELL WOMAN EXAM WITH ROUTINE GYNECOLOGICAL EXAM: Primary | ICD-10-CM

## 2025-06-24 DIAGNOSIS — R00.2 PALPITATIONS: ICD-10-CM

## 2025-06-24 DIAGNOSIS — Z30.44 ENCOUNTER FOR SURVEILLANCE OF VAGINAL RING HORMONAL CONTRACEPTIVE DEVICE: ICD-10-CM

## 2025-06-24 LAB
BILIRUB BLD-MCNC: NEGATIVE MG/DL
CLARITY, POC: ABNORMAL
COLOR UR: ABNORMAL
EXPIRATION DATE: ABNORMAL
GLUCOSE UR STRIP-MCNC: NEGATIVE MG/DL
KETONES UR QL: ABNORMAL
LEUKOCYTE EST, POC: ABNORMAL
Lab: ABNORMAL
NITRITE UR-MCNC: NEGATIVE MG/ML
PH UR: 5.5 [PH] (ref 5–8)
PROT UR STRIP-MCNC: ABNORMAL MG/DL
RBC # UR STRIP: ABNORMAL /UL
SP GR UR: 1.02 (ref 1–1.03)
UROBILINOGEN UR QL: ABNORMAL

## 2025-06-24 PROCEDURE — 80061 LIPID PANEL: CPT | Performed by: NURSE PRACTITIONER

## 2025-06-24 PROCEDURE — 99214 OFFICE O/P EST MOD 30 MIN: CPT | Performed by: NURSE PRACTITIONER

## 2025-06-24 PROCEDURE — 36415 COLL VENOUS BLD VENIPUNCTURE: CPT | Performed by: NURSE PRACTITIONER

## 2025-06-24 PROCEDURE — 84443 ASSAY THYROID STIM HORMONE: CPT | Performed by: NURSE PRACTITIONER

## 2025-06-24 PROCEDURE — 81515 NFCT DS BV&VAGINITIS DNA ALG: CPT | Performed by: NURSE PRACTITIONER

## 2025-06-24 PROCEDURE — 87624 HPV HI-RISK TYP POOLED RSLT: CPT | Performed by: NURSE PRACTITIONER

## 2025-06-24 PROCEDURE — 81003 URINALYSIS AUTO W/O SCOPE: CPT | Performed by: NURSE PRACTITIONER

## 2025-06-24 PROCEDURE — 85027 COMPLETE CBC AUTOMATED: CPT | Performed by: NURSE PRACTITIONER

## 2025-06-24 PROCEDURE — 99395 PREV VISIT EST AGE 18-39: CPT | Performed by: NURSE PRACTITIONER

## 2025-06-24 PROCEDURE — G0123 SCREEN CERV/VAG THIN LAYER: HCPCS | Performed by: NURSE PRACTITIONER

## 2025-06-24 PROCEDURE — 80053 COMPREHEN METABOLIC PANEL: CPT | Performed by: NURSE PRACTITIONER

## 2025-06-24 RX ORDER — ETONOGESTREL AND ETHINYL ESTRADIOL VAGINAL .015; .12 MG/D; MG/D
RING VAGINAL
Qty: 1 EACH | Refills: 11 | Status: SHIPPED | OUTPATIENT
Start: 2025-06-24

## 2025-06-24 RX ORDER — METOPROLOL SUCCINATE 25 MG/1
25 TABLET, EXTENDED RELEASE ORAL EVERY MORNING
Qty: 30 TABLET | Refills: 5 | Status: SHIPPED | OUTPATIENT
Start: 2025-06-24

## 2025-06-24 RX ORDER — HYDROXYZINE HYDROCHLORIDE 10 MG/1
10 TABLET, FILM COATED ORAL 3 TIMES DAILY PRN
Qty: 30 TABLET | Refills: 5 | Status: CANCELLED | OUTPATIENT
Start: 2025-06-24

## 2025-06-24 RX ORDER — LEVOTHYROXINE SODIUM 88 UG/1
88 TABLET ORAL EVERY MORNING
Qty: 30 TABLET | Refills: 5 | Status: SHIPPED | OUTPATIENT
Start: 2025-06-24

## 2025-06-24 NOTE — PROGRESS NOTES
Chief Complaint  Gynecologic Exam and Hypothyroidism (Medication refills. )    Subjective            Annie Oshea presents to [facility]  History of Present Illness    History of Present Illness  The patient is a 39-year-old female who presents for her well-woman exam and medication refills on chronic comorbid conditions from a primary care standpoint.    She has been under the care of a dermatologist due to a previous diagnosis of rosacea. Recently, she noticed a spot on her nose, initially thought to be a rosacea flare-up, but there was also concern for basal cell carcinoma. The spot resolved but recurred before her next appointment 6 weeks later. She was prescribed a cream, which she used for a week, resulting in a sunburn-like sensation. She is seeking advice on whether she can apply aloe or Aquaphor to aid in healing. Her next dermatology appointment is scheduled for 01/2026.    She reports no symptoms of urinary tract infection, nipple discharge, breast lumps or pain, menstrual issues, abnormal discharge, chest pain, shortness of breath, abdominal pain, blood in stool, throat lumps, difficulty swallowing, dizziness, lightheadedness, seizures, fainting, excessive thirst or hunger, frequent urination, blurred or double vision, suicidal or self-injurious thoughts. She does report constant fatigue. She performs self-breast exams occasionally in the shower. She has not been sexually active recently. She has a history of gestational diabetes and has experienced similar symptoms when consuming sugary foods, leading her to reduce her sugar intake. She has a history of degenerative disc disease in her lower back, managed with chiropractic care.    Hypothyroidism: Tolerating medication well no side effects no issues reported no hair nail skin issues reported-she is currently on levothyroxine 88 mcg     Hypertension and tachycardia: Tolerating medication well no side effects no issues  "reported no chest pain shortness of breath syncopal episodes dizziness or lightheadedness overall stable and currently on Toprol-XL 25 mg.      but does not require a refill for hydroxyzine as she has not used it in over a year.    Birth control method-not sexually active at this time denies pregnancy-She is requesting refills for her vaginal ring--she experienced a weight gain about a month ago due to inconsistent medication use but has since lost 12 pounds in the last 4 weeks.    GYNECOLOGICAL HISTORY:  - Last Menstrual Period: 06/2025    FAMILY HISTORY  Her sister had multiple fibrous lumps in her breasts, but her mammogram was clear.      PHQ-2 Total Score: 1    PHQ-9 Total Score:        Past Medical History:   Diagnosis Date    Anxiety     Cardiomyopathy     PER DR JIMENEZ OFFICE NOTE 4/22/22-DD    Chiari I malformation     Depression     Gestational diabetes     Hashimoto's disease     Hypothyroid     Kidney stones     Palpitations     PER DR JIMENEZ OFFICE NOTE 4/22/22-DD    Pre-eclampsia     PVC (premature ventricular contraction)     Sinus tachycardia        No Known Allergies     Past Surgical History:   Procedure Laterality Date    KIDNEY STONE SURGERY      MOLE REMOVAL      WISDOM TOOTH EXTRACTION          Social History     Tobacco Use    Smoking status: Never     Passive exposure: Never    Smokeless tobacco: Never    Tobacco comments:     no caffeine   Vaping Use    Vaping status: Never Used   Substance Use Topics    Alcohol use: Yes     Comment: \"social\"    Drug use: Never       Family History   Problem Relation Age of Onset    Depression Mother     Hypertension Mother     Arthritis Mother     Hypertension Father     Arthritis Sister     Asthma Brother     Depression Maternal Grandmother     Diabetes type II Maternal Grandmother     Hypertension Maternal Grandmother     Arthritis Maternal Grandmother     Diabetes type II Maternal Grandfather     Hypertension Paternal Grandmother     Alcohol abuse " Paternal Grandfather     Heart disease Other         There are no preventive care reminders to display for this patient.       Current Outpatient Medications on File Prior to Visit   Medication Sig    Daily Probiotic Supplement 250 MG capsule Take 1 capsule by mouth Daily.    hydrOXYzine (ATARAX) 10 MG tablet Take 1 tablet by mouth 3 (Three) Times a Day As Needed for Anxiety.    Multiple Vitamins-Minerals (Hair Skin & Nails) tablet Take 2 tablets by mouth Daily.    One-Daily Multi-Vitamin tablet tablet Take 1 tablet by mouth Daily.    [DISCONTINUED] EnilloRing 0.12-0.015 MG/24HR vaginal ring Insert 1 ring into the vagina every 28 days.    [DISCONTINUED] levothyroxine (SYNTHROID, LEVOTHROID) 88 MCG tablet TAKE 1 TABLET BY MOUTH EVERY MORNING    [DISCONTINUED] metoprolol succinate XL (TOPROL-XL) 25 MG 24 hr tablet TAKE 1 TABLET BY MOUTH EVERY MORNING     No current facility-administered medications on file prior to visit.       Immunization History   Administered Date(s) Administered    COVID-19 (MODERNA) Monovalent Original Booster 03/10/2021, 04/07/2021    Flu Vaccine Quad PF >36MO 10/27/2021    Fluzone (or Fluarix & Flulaval for VFC) >6mos 10/27/2021    Fluzone Quad >6mos (Multi-dose) 09/15/2020    Influenza, Unspecified 11/07/2019, 09/24/2020    Tdap 04/06/2022       Review of Systems   Constitutional:  Positive for fatigue.   HENT:  Negative for trouble swallowing.    Eyes:  Negative for blurred vision and double vision.   Respiratory:  Negative for shortness of breath.    Cardiovascular:  Negative for chest pain.   Gastrointestinal:  Negative for abdominal pain and blood in stool.   Endocrine: Negative for polydipsia, polyphagia and polyuria.   Genitourinary:  Negative for breast discharge, breast lump, breast pain, dysuria, menstrual problem, vaginal bleeding and vaginal discharge.   Musculoskeletal:  Positive for back pain.        DDD Hx   Skin:  Positive for rash.        Redness to the end of the nose and  "seeing dermatology and then she gave th ept a topical cream and then will be F/U with her    Neurological:  Negative for dizziness, seizures, syncope and light-headedness.   Hematological:  Negative for adenopathy.   Psychiatric/Behavioral:  Negative for self-injury and suicidal ideas.         Objective     /60   Pulse 104   Temp 97 °F (36.1 °C) (Temporal)   Ht 175.3 cm (69\")   Wt 113 kg (249 lb)   SpO2 98%   BMI 36.77 kg/m²       Physical Exam  Vitals and nursing note reviewed. Chaperone present: Declined chaperone.   Constitutional:       Appearance: Normal appearance.   HENT:      Head: Normocephalic.      Right Ear: External ear normal.      Left Ear: External ear normal.      Nose: Nose normal.      Mouth/Throat:      Mouth: Mucous membranes are moist.   Eyes:      Pupils: Pupils are equal, round, and reactive to light.   Cardiovascular:      Rate and Rhythm: Normal rate and regular rhythm.      Heart sounds: Normal heart sounds.   Pulmonary:      Effort: Pulmonary effort is normal.      Breath sounds: Normal breath sounds.   Chest:   Breasts:     Adiel Score is 5.      Breasts are symmetrical.      Right: Normal. No swelling, bleeding, inverted nipple, mass, nipple discharge, skin change or tenderness.      Left: Normal. No swelling, bleeding, inverted nipple, mass, nipple discharge, skin change or tenderness.   Abdominal:      General: Bowel sounds are normal.      Palpations: Abdomen is soft.      Tenderness: There is no abdominal tenderness.      Hernia: There is no hernia in the left inguinal area or right inguinal area.   Genitourinary:     General: Normal vulva.      Exam position: Lithotomy position.      Pubic Area: No rash or pubic lice.       Adiel stage (genital): 5.      Labia:         Right: No rash, tenderness, lesion or injury.         Left: No rash, tenderness, lesion or injury.       Urethra: No prolapse, urethral pain, urethral swelling or urethral lesion.      Vagina: No signs " of injury and foreign body. Vaginal discharge present. No erythema, tenderness, bleeding or lesions.      Cervix: Discharge, friability, erythema and cervical bleeding present. No cervical motion tenderness, lesion or eversion.      Uterus: Not deviated, not enlarged, not fixed, not tender and no uterine prolapse.       Adnexa:         Right: No mass, tenderness or fullness.          Left: No mass, tenderness or fullness.        Comments: Just a slight very mild prominence of the clitoral area but no actual prolapse noted and obtained swab for bacterial vaginosis and the Pap smear tolerated well and there was a great deal of friability and bleeding during the Pap smear at the cervical area--and the birth control ring was also present  Musculoskeletal:         General: Normal range of motion.      Cervical back: Normal range of motion and neck supple.   Lymphadenopathy:      Upper Body:      Right upper body: No supraclavicular or axillary adenopathy.      Left upper body: No supraclavicular or axillary adenopathy.      Lower Body: No right inguinal adenopathy. No left inguinal adenopathy.   Skin:     General: Skin is warm and dry.      Comments: Checked her scalp and found no nits no live bugs   Neurological:      Mental Status: She is alert and oriented to person, place, and time.   Psychiatric:         Mood and Affect: Mood normal.         Behavior: Behavior normal.         Thought Content: Thought content normal.         Judgment: Judgment normal.         Result Review :     The following data was reviewed by: JOSE A Phillips on 06/24/2025:    UA          6/24/2025    17:03   Urinalysis   Ketones, UA 15 mg/dL    Leukocytes, UA Trace               Results  Labs   - Urinalysis: Trace leukocytes and trace blood               Assessment and Plan      Diagnoses and all orders for this visit:    1. Well woman exam with routine gynecological exam (Primary)  -     POCT urinalysis dipstick, automated  -     IgP,  Aptima HPV  -     Lipid panel  -     MVP Vaginosis Panel - Swab, Vagina    2. Hypothyroidism due to Hashimoto's thyroiditis  -     levothyroxine (SYNTHROID, LEVOTHROID) 88 MCG tablet; Take 1 tablet by mouth Every Morning.  Dispense: 30 tablet; Refill: 5  -     TSH Rfx On Abnormal To Free T4  -     CBC (No Diff)  -     Comprehensive Metabolic Panel  -     Lipid panel    3. Encounter for surveillance of vaginal ring hormonal contraceptive device  -     EnilloRing 0.12-0.015 MG/24HR vaginal ring; Insert vaginally and leave in place for 3 consecutive weeks, then remove for 1 week.  Dispense: 1 each; Refill: 11    4. Situational anxiety  -     TSH Rfx On Abnormal To Free T4  -     CBC (No Diff)  -     Comprehensive Metabolic Panel    5. Fluctuating blood pressure  -     metoprolol succinate XL (TOPROL-XL) 25 MG 24 hr tablet; Take 1 tablet by mouth Every Morning.  Dispense: 30 tablet; Refill: 5  -     TSH Rfx On Abnormal To Free T4  -     Comprehensive Metabolic Panel    6. Palpitations  -     metoprolol succinate XL (TOPROL-XL) 25 MG 24 hr tablet; Take 1 tablet by mouth Every Morning.  Dispense: 30 tablet; Refill: 5  -     TSH Rfx On Abnormal To Free T4  -     Comprehensive Metabolic Panel    7. Vaginal discharge  -     MVP Vaginosis Panel - Swab, Vagina    Medications refilled as noted above and labs as noted above    And Pap smear and vaginosis panel as noted above    And filled out the 1 paper for her annual wellness stating that she did come for her annual wellness and Pap smear today    And then will fill in the other page once we receive all the labs back and we will get that faxed for the patient      Assessment & Plan  1. Rosacea.  - Recent flare-up treated with prescribed cream, resulting in a burning sensation similar to a sunburn.  - Dermatologist suspected basal cell carcinoma; lesion initially resolved but recurred.  - Advised to contact dermatologist regarding use of aloe or Aquaphor for relief.    2.  Thyroid disorder.  - Thyroid level test ordered for monitoring.  - Currently taking levothyroxine 88 mcg.    3. Hypertension.  - Blood pressure well-controlled at 116/60 mmHg.  - Currently taking Toprol-XL 25 mg.    4. Medication management.  - Refills requested for vaginal ring and Toprol-XL 25 mg.  - Hydroxyzine not used for over a year but preferred to keep as-needed for anxiety and stress.    5. Health maintenance.  - Pap smear performed during visit.  - Cholesterol level test ordered for biometric screening at work.  - Advised to perform self-breast exams post-menstruation.    6. Degenerative disc disease.  - History of degenerative disc disease in lower back, managed with chiropractic care.    7. Weight management.  - Experienced weight gain due to inconsistent medication use; lost 12 pounds in the last 4 weeks.          Follow Up     Return in about 6 months (around 12/24/2025), or if symptoms worsen or fail to improve, for Recheck.    Patient was given instructions and counseling regarding her condition or for health maintenance advice. Please see specific information pulled into the AVS if appropriate.     Class 2 Severe Obesity (BMI >=35 and <=39.9). Obesity-related health conditions include the following: hypertension and thyroid. Obesity is worsening. BMI is is above average; BMI management plan is completed. We discussed portion control and increasing exercise.      Annie Oshea  reports that she has never smoked. She has never been exposed to tobacco smoke. She has never used smokeless tobacco. I have educated her on the risk of diseases from using tobacco products such as cancer, COPD, and heart disease.           Patient or patient representative verbalized consent for the use of Ambient Listening during the visit with  JOSE A Phillips for chart documentation. 6/24/2025  17:10 EDT

## 2025-06-24 NOTE — PROGRESS NOTES
Venipuncture Blood Specimen Collection  Venipuncture performed in LEFT ARM  by Janna Bell with good hemostasis. Patient tolerated the procedure well without complications.   06/24/25   Janna Bell

## 2025-06-25 LAB
ALBUMIN SERPL-MCNC: 4.1 G/DL (ref 3.5–5.2)
ALBUMIN/GLOB SERPL: 1.4 G/DL
ALP SERPL-CCNC: 67 U/L (ref 39–117)
ALT SERPL W P-5'-P-CCNC: 18 U/L (ref 1–33)
ANION GAP SERPL CALCULATED.3IONS-SCNC: 11.4 MMOL/L (ref 5–15)
AST SERPL-CCNC: 17 U/L (ref 1–32)
BACTERIAL VAGINOSIS VAG-IMP: NEGATIVE
BILIRUB SERPL-MCNC: <0.2 MG/DL (ref 0–1.2)
BUN SERPL-MCNC: 10 MG/DL (ref 6–20)
BUN/CREAT SERPL: 16.1 (ref 7–25)
CALCIUM SPEC-SCNC: 9.2 MG/DL (ref 8.6–10.5)
CANDIDA DNA VAG QL NAA+PROBE: NOT DETECTED
CANDIDA DNA VAG QL NAA+PROBE: NOT DETECTED
CHLORIDE SERPL-SCNC: 106 MMOL/L (ref 98–107)
CHOLEST SERPL-MCNC: 140 MG/DL (ref 0–200)
CO2 SERPL-SCNC: 23.6 MMOL/L (ref 22–29)
CREAT SERPL-MCNC: 0.62 MG/DL (ref 0.57–1)
DEPRECATED RDW RBC AUTO: 43 FL (ref 37–54)
EGFRCR SERPLBLD CKD-EPI 2021: 116.3 ML/MIN/1.73
ERYTHROCYTE [DISTWIDTH] IN BLOOD BY AUTOMATED COUNT: 12.7 % (ref 12.3–15.4)
GLOBULIN UR ELPH-MCNC: 3 GM/DL
GLUCOSE SERPL-MCNC: 94 MG/DL (ref 65–99)
HCT VFR BLD AUTO: 41.9 % (ref 34–46.6)
HDLC SERPL-MCNC: 44 MG/DL (ref 40–60)
HGB BLD-MCNC: 13.4 G/DL (ref 12–15.9)
LDLC SERPL CALC-MCNC: 63 MG/DL (ref 0–100)
LDLC/HDLC SERPL: 1.27 {RATIO}
MCH RBC QN AUTO: 30 PG (ref 26.6–33)
MCHC RBC AUTO-ENTMCNC: 32 G/DL (ref 31.5–35.7)
MCV RBC AUTO: 93.7 FL (ref 79–97)
PLATELET # BLD AUTO: 321 10*3/MM3 (ref 140–450)
PMV BLD AUTO: 11.8 FL (ref 6–12)
POTASSIUM SERPL-SCNC: 4.1 MMOL/L (ref 3.5–5.2)
PROT SERPL-MCNC: 7.1 G/DL (ref 6–8.5)
RBC # BLD AUTO: 4.47 10*6/MM3 (ref 3.77–5.28)
SODIUM SERPL-SCNC: 141 MMOL/L (ref 136–145)
T VAGINALIS DNA VAG QL NAA+PROBE: NOT DETECTED
TRIGL SERPL-MCNC: 201 MG/DL (ref 0–150)
TSH SERPL DL<=0.05 MIU/L-ACNC: 4.2 UIU/ML (ref 0.27–4.2)
VLDLC SERPL-MCNC: 33 MG/DL (ref 5–40)
WBC NRBC COR # BLD AUTO: 9.64 10*3/MM3 (ref 3.4–10.8)

## 2025-06-26 ENCOUNTER — RESULTS FOLLOW-UP (OUTPATIENT)
Dept: FAMILY MEDICINE CLINIC | Facility: CLINIC | Age: 40
End: 2025-06-26
Payer: COMMERCIAL

## 2025-06-26 NOTE — LETTER
Annie Oshea  [id number]  [facility] [address]    June 26, 2025     Dear Ms. Oshea:    Annie I did fill out the paperwork and they are going to call you regarding this-but your cholesterol panel was all normal with the exception of the triglycerides elevated at 201 and should be less than 150 if fasting and then your blood counts were normal and your thyroid levels were normal and your comprehensive panel shows normal glucose and normal kidney and liver function test and normal electrolytes and then the bacterial vaginosis panel was negative and we are still awaiting the Pap smear results     Below are the results from your recent visit:    Resulted Orders   POCT urinalysis dipstick, automated   Result Value Ref Range    Color Dark Yellow Yellow, Straw, Dark Yellow, Bing    Clarity, UA Cloudy (A) Clear    Specific Gravity  1.025 1.005 - 1.030    pH, Urine 5.5 5.0 - 8.0    Leukocytes Trace (A) Negative    Nitrite, UA Negative Negative    Protein, POC 30 mg/dL (A) Negative mg/dL    Glucose, UA Negative Negative mg/dL    Ketones, UA 15 mg/dL (A) Negative    Urobilinogen, UA 0.2 E.U./dL Normal, 0.2 E.U./dL    Bilirubin Negative Negative    Blood, UA Trace (A) Negative    Lot Number [id number]     Expiration Date 1/302,026    TSH Rfx On Abnormal To Free T4   Result Value Ref Range    TSH 4.200 0.270 - 4.200 uIU/mL   CBC (No Diff)   Result Value Ref Range    WBC 9.64 3.40 - 10.80 10*3/mm3    RBC 4.47 3.77 - 5.28 10*6/mm3    Hemoglobin 13.4 12.0 - 15.9 g/dL    Hematocrit 41.9 34.0 - 46.6 %    MCV 93.7 79.0 - 97.0 fL    MCH 30.0 26.6 - 33.0 pg    MCHC 32.0 31.5 - 35.7 g/dL    RDW 12.7 12.3 - 15.4 %    RDW-SD 43.0 37.0 - 54.0 fl    MPV 11.8 6.0 - 12.0 fL    Platelets 321 140 - 450 10*3/mm3   Comprehensive Metabolic Panel   Result Value Ref Range    Glucose 94 65 - 99 mg/dL    BUN 10.0 6.0 - 20.0 mg/dL    Creatinine 0.62 0.57 - 1.00 mg/dL    Sodium 141 136 - 145 mmol/L    Potassium 4.1 3.5 - 5.2 mmol/L    Chloride 106  98 - 107 mmol/L    CO2 23.6 22.0 - 29.0 mmol/L    Calcium 9.2 8.6 - 10.5 mg/dL    Total Protein 7.1 6.0 - 8.5 g/dL    Albumin 4.1 3.5 - 5.2 g/dL    ALT (SGPT) 18 1 - 33 U/L    AST (SGOT) 17 1 - 32 U/L    Alkaline Phosphatase 67 39 - 117 U/L    Total Bilirubin <0.2 0.0 - 1.2 mg/dL    Globulin 3.0 gm/dL    A/G Ratio 1.4 g/dL    BUN/Creatinine Ratio 16.1 7.0 - 25.0    Anion Gap 11.4 5.0 - 15.0 mmol/L    eGFR 116.3 >60.0 mL/min/1.73   Lipid panel   Result Value Ref Range    Total Cholesterol 140 0 - 200 mg/dL    Triglycerides 201 (H) 0 - 150 mg/dL    HDL Cholesterol 44 40 - 60 mg/dL    LDL Cholesterol  63 0 - 100 mg/dL    VLDL Cholesterol 33 5 - 40 mg/dL    LDL/HDL Ratio 1.27    MVP Vaginosis Panel - Swab, Vagina   Result Value Ref Range    Bacterial vaginosis Negative Negative    Candida glabrata/krusei Not Detected Not Detected    CANDIDA GROUP Not Detected Not Detected      Comment:      Candida group contains one or more of the following: C. albicans, C. tropicalis, C. parapsilosis or C. dubliniensis.    Trichomonas vaginalis Not Detected Not Detected       If you have any questions or concerns, please don't hesitate to call.         Sincerely,        JOSE A Phillips

## 2025-06-26 NOTE — PROGRESS NOTES
Please mail letter to patient stating    Annie I did fill out the paperwork and they are going to call you regarding this-but your cholesterol panel was all normal with the exception of the triglycerides elevated at 201 and should be less than 150 if fasting and then your blood counts were normal and your thyroid levels were normal and your comprehensive panel shows normal glucose and normal kidney and liver function test and normal electrolytes and then the bacterial vaginosis panel was negative and we are still awaiting the Pap smear results

## 2025-06-27 LAB
CYTOLOGIST CVX/VAG CYTO: NORMAL
CYTOLOGY CVX/VAG DOC CYTO: NORMAL
CYTOLOGY CVX/VAG DOC THIN PREP: NORMAL
DX ICD CODE: NORMAL
HPV I/H RISK 4 DNA CVX QL PROBE+SIG AMP: NEGATIVE
OTHER STN SPEC: NORMAL
SERVICE CMNT-IMP: NORMAL
STAT OF ADQ CVX/VAG CYTO-IMP: NORMAL